# Patient Record
Sex: MALE | Race: WHITE | NOT HISPANIC OR LATINO | URBAN - METROPOLITAN AREA
[De-identification: names, ages, dates, MRNs, and addresses within clinical notes are randomized per-mention and may not be internally consistent; named-entity substitution may affect disease eponyms.]

---

## 2017-07-01 ENCOUNTER — EMERGENCY (EMERGENCY)
Facility: HOSPITAL | Age: 67
LOS: 0 days | Discharge: HOME | End: 2017-07-01

## 2017-07-01 DIAGNOSIS — Z79.02 LONG TERM (CURRENT) USE OF ANTITHROMBOTICS/ANTIPLATELETS: ICD-10-CM

## 2017-07-01 DIAGNOSIS — I10 ESSENTIAL (PRIMARY) HYPERTENSION: ICD-10-CM

## 2017-07-01 DIAGNOSIS — Z79.899 OTHER LONG TERM (CURRENT) DRUG THERAPY: ICD-10-CM

## 2017-07-01 DIAGNOSIS — I16.0 HYPERTENSIVE URGENCY: ICD-10-CM

## 2017-07-01 DIAGNOSIS — R10.32 LEFT LOWER QUADRANT PAIN: ICD-10-CM

## 2017-07-01 DIAGNOSIS — N20.0 CALCULUS OF KIDNEY: ICD-10-CM

## 2017-07-01 DIAGNOSIS — Z86.711 PERSONAL HISTORY OF PULMONARY EMBOLISM: ICD-10-CM

## 2017-07-01 DIAGNOSIS — E78.00 PURE HYPERCHOLESTEROLEMIA, UNSPECIFIED: ICD-10-CM

## 2017-07-01 DIAGNOSIS — I26.99 OTHER PULMONARY EMBOLISM WITHOUT ACUTE COR PULMONALE: ICD-10-CM

## 2017-07-01 DIAGNOSIS — N20.2 CALCULUS OF KIDNEY WITH CALCULUS OF URETER: ICD-10-CM

## 2017-07-01 DIAGNOSIS — I62.00 NONTRAUMATIC SUBDURAL HEMORRHAGE, UNSPECIFIED: ICD-10-CM

## 2017-07-05 ENCOUNTER — EMERGENCY (EMERGENCY)
Facility: HOSPITAL | Age: 67
LOS: 0 days | Discharge: HOME | End: 2017-07-05

## 2017-07-05 DIAGNOSIS — I10 ESSENTIAL (PRIMARY) HYPERTENSION: ICD-10-CM

## 2017-07-05 DIAGNOSIS — I16.0 HYPERTENSIVE URGENCY: ICD-10-CM

## 2017-07-05 DIAGNOSIS — Z79.02 LONG TERM (CURRENT) USE OF ANTITHROMBOTICS/ANTIPLATELETS: ICD-10-CM

## 2017-07-05 DIAGNOSIS — Z79.899 OTHER LONG TERM (CURRENT) DRUG THERAPY: ICD-10-CM

## 2017-07-05 DIAGNOSIS — I26.99 OTHER PULMONARY EMBOLISM WITHOUT ACUTE COR PULMONALE: ICD-10-CM

## 2017-07-05 DIAGNOSIS — R10.32 LEFT LOWER QUADRANT PAIN: ICD-10-CM

## 2017-07-05 DIAGNOSIS — I62.00 NONTRAUMATIC SUBDURAL HEMORRHAGE, UNSPECIFIED: ICD-10-CM

## 2017-07-05 DIAGNOSIS — E78.00 PURE HYPERCHOLESTEROLEMIA, UNSPECIFIED: ICD-10-CM

## 2017-07-05 DIAGNOSIS — Z87.442 PERSONAL HISTORY OF URINARY CALCULI: ICD-10-CM

## 2017-07-05 DIAGNOSIS — N23 UNSPECIFIED RENAL COLIC: ICD-10-CM

## 2017-07-05 DIAGNOSIS — N20.0 CALCULUS OF KIDNEY: ICD-10-CM

## 2017-10-05 ENCOUNTER — EMERGENCY (EMERGENCY)
Facility: HOSPITAL | Age: 67
LOS: 0 days | Discharge: LEFT BEFORE DISCHARGE | End: 2017-10-05
Admitting: INTERNAL MEDICINE

## 2017-10-05 DIAGNOSIS — Z79.899 OTHER LONG TERM (CURRENT) DRUG THERAPY: ICD-10-CM

## 2017-10-05 DIAGNOSIS — I16.0 HYPERTENSIVE URGENCY: ICD-10-CM

## 2017-10-05 DIAGNOSIS — Z86.711 PERSONAL HISTORY OF PULMONARY EMBOLISM: ICD-10-CM

## 2017-10-05 DIAGNOSIS — I26.99 OTHER PULMONARY EMBOLISM WITHOUT ACUTE COR PULMONALE: ICD-10-CM

## 2017-10-05 DIAGNOSIS — E78.00 PURE HYPERCHOLESTEROLEMIA, UNSPECIFIED: ICD-10-CM

## 2017-10-05 DIAGNOSIS — R94.31 ABNORMAL ELECTROCARDIOGRAM [ECG] [EKG]: ICD-10-CM

## 2017-10-05 DIAGNOSIS — I10 ESSENTIAL (PRIMARY) HYPERTENSION: ICD-10-CM

## 2017-10-05 DIAGNOSIS — Z79.02 LONG TERM (CURRENT) USE OF ANTITHROMBOTICS/ANTIPLATELETS: ICD-10-CM

## 2017-10-05 DIAGNOSIS — R07.89 OTHER CHEST PAIN: ICD-10-CM

## 2017-10-05 DIAGNOSIS — N20.0 CALCULUS OF KIDNEY: ICD-10-CM

## 2017-10-05 DIAGNOSIS — R20.0 ANESTHESIA OF SKIN: ICD-10-CM

## 2017-10-05 DIAGNOSIS — I62.00 NONTRAUMATIC SUBDURAL HEMORRHAGE, UNSPECIFIED: ICD-10-CM

## 2018-02-11 ENCOUNTER — EMERGENCY (EMERGENCY)
Facility: HOSPITAL | Age: 68
LOS: 0 days | Discharge: HOME | End: 2018-02-11
Attending: EMERGENCY MEDICINE | Admitting: INTERNAL MEDICINE

## 2018-02-11 VITALS
SYSTOLIC BLOOD PRESSURE: 157 MMHG | HEART RATE: 58 BPM | OXYGEN SATURATION: 99 % | DIASTOLIC BLOOD PRESSURE: 80 MMHG | HEIGHT: 71 IN | TEMPERATURE: 96 F | WEIGHT: 179.9 LBS | RESPIRATION RATE: 19 BRPM

## 2018-02-11 DIAGNOSIS — Z79.02 LONG TERM (CURRENT) USE OF ANTITHROMBOTICS/ANTIPLATELETS: ICD-10-CM

## 2018-02-11 DIAGNOSIS — Z87.891 PERSONAL HISTORY OF NICOTINE DEPENDENCE: ICD-10-CM

## 2018-02-11 DIAGNOSIS — I25.10 ATHEROSCLEROTIC HEART DISEASE OF NATIVE CORONARY ARTERY WITHOUT ANGINA PECTORIS: ICD-10-CM

## 2018-02-11 DIAGNOSIS — R09.81 NASAL CONGESTION: ICD-10-CM

## 2018-02-11 DIAGNOSIS — J32.9 CHRONIC SINUSITIS, UNSPECIFIED: ICD-10-CM

## 2018-02-11 DIAGNOSIS — Z79.899 OTHER LONG TERM (CURRENT) DRUG THERAPY: ICD-10-CM

## 2018-02-11 DIAGNOSIS — I10 ESSENTIAL (PRIMARY) HYPERTENSION: ICD-10-CM

## 2018-02-11 DIAGNOSIS — J02.9 ACUTE PHARYNGITIS, UNSPECIFIED: ICD-10-CM

## 2018-02-11 RX ORDER — MOMETASONE FUROATE 50 UG/1
2 SPRAY NASAL
Qty: 1 | Refills: 0
Start: 2018-02-11 | End: 2018-03-12

## 2018-02-11 RX ADMIN — Medication 1 TABLET(S): at 21:07

## 2018-02-11 NOTE — ED PROVIDER NOTE - PMH
Abdominal aortic aneurysm (AAA) without rupture    Kidney calculi    Non-ST elevation (NSTEMI) myocardial infarction

## 2018-02-11 NOTE — ED PROVIDER NOTE - OBJECTIVE STATEMENT
69 yo M with PMHx of HTN, AAA, and CAD presents to the ED c/o nasal congestion and sore throat that started yesterday. Symptoms have been constants since onset. Pt has been using nose strips for congestion without relief of symptoms. Pt denies fever, chills, nausea, vomiting, abdominal pain, back pain, dizziness, SOB, chest pain, trauma, sick contacts.

## 2018-02-11 NOTE — ED PROVIDER NOTE - PLAN OF CARE
I personally evaluated the patient. I reviewed the Resident’s or Physician Assistant’s note (as assigned above), and agree with the findings and plan except as documented in my note.  Presents with nasal congestion and difficulty breathing through nose. Denies chest pain or SOB. Exam shows +swollen turbinates with yellowish nasal discharge, throat red with small exudates, lungs clear, RR S1S2, abdomen soft NT +BS, no CCE. Will D/C on Augmentin and Nasonex and refer to PCP.

## 2018-02-11 NOTE — ED PROVIDER NOTE - CARE PLAN
Assessment and plan of treatment:	I personally evaluated the patient. I reviewed the Resident’s or Physician Assistant’s note (as assigned above), and agree with the findings and plan except as documented in my note.  Presents with nasal congestion and difficulty breathing through nose. Denies chest pain or SOB. Exam shows +swollen turbinates with yellowish nasal discharge, throat red with small exudates, lungs clear, RR S1S2, abdomen soft NT +BS, no CCE. Will D/C on Augmentin and Nasonex and refer to PCP. Principal Discharge DX:	Sinusitis  Assessment and plan of treatment:	I personally evaluated the patient. I reviewed the Resident’s or Physician Assistant’s note (as assigned above), and agree with the findings and plan except as documented in my note.  Presents with nasal congestion and difficulty breathing through nose. Denies chest pain or SOB. Exam shows +swollen turbinates with yellowish nasal discharge, throat red with small exudates, lungs clear, RR S1S2, abdomen soft NT +BS, no CCE. Will D/C on Augmentin and Nasonex and refer to PCP.  Secondary Diagnosis:	Pharyngitis, unspecified etiology Principal Discharge DX:	Sinusitis  Secondary Diagnosis:	Pharyngitis, unspecified etiology

## 2019-12-13 PROBLEM — I21.4 NON-ST ELEVATION (NSTEMI) MYOCARDIAL INFARCTION: Chronic | Status: ACTIVE | Noted: 2018-02-11

## 2019-12-13 PROBLEM — I71.4 ABDOMINAL AORTIC ANEURYSM, WITHOUT RUPTURE: Chronic | Status: ACTIVE | Noted: 2018-02-11

## 2019-12-13 PROBLEM — N20.0 CALCULUS OF KIDNEY: Chronic | Status: ACTIVE | Noted: 2018-02-11

## 2019-12-18 ENCOUNTER — OUTPATIENT (OUTPATIENT)
Dept: OUTPATIENT SERVICES | Facility: HOSPITAL | Age: 69
LOS: 1 days | Discharge: HOME | End: 2019-12-18
Payer: MEDICARE

## 2019-12-18 DIAGNOSIS — R06.00 DYSPNEA, UNSPECIFIED: ICD-10-CM

## 2019-12-18 PROCEDURE — 78452 HT MUSCLE IMAGE SPECT MULT: CPT | Mod: 26

## 2022-06-02 ENCOUNTER — EMERGENCY (EMERGENCY)
Facility: HOSPITAL | Age: 72
LOS: 0 days | Discharge: HOME | End: 2022-06-02
Attending: EMERGENCY MEDICINE | Admitting: EMERGENCY MEDICINE
Payer: MEDICARE

## 2022-06-02 VITALS
OXYGEN SATURATION: 99 % | HEIGHT: 71 IN | TEMPERATURE: 98 F | RESPIRATION RATE: 18 BRPM | HEART RATE: 59 BPM | DIASTOLIC BLOOD PRESSURE: 89 MMHG | WEIGHT: 175.05 LBS | SYSTOLIC BLOOD PRESSURE: 195 MMHG

## 2022-06-02 VITALS
DIASTOLIC BLOOD PRESSURE: 78 MMHG | SYSTOLIC BLOOD PRESSURE: 155 MMHG | HEART RATE: 52 BPM | TEMPERATURE: 98 F | RESPIRATION RATE: 18 BRPM | OXYGEN SATURATION: 98 %

## 2022-06-02 DIAGNOSIS — Z79.02 LONG TERM (CURRENT) USE OF ANTITHROMBOTICS/ANTIPLATELETS: ICD-10-CM

## 2022-06-02 DIAGNOSIS — I10 ESSENTIAL (PRIMARY) HYPERTENSION: ICD-10-CM

## 2022-06-02 DIAGNOSIS — R07.89 OTHER CHEST PAIN: ICD-10-CM

## 2022-06-02 DIAGNOSIS — R00.1 BRADYCARDIA, UNSPECIFIED: ICD-10-CM

## 2022-06-02 DIAGNOSIS — Z20.822 CONTACT WITH AND (SUSPECTED) EXPOSURE TO COVID-19: ICD-10-CM

## 2022-06-02 DIAGNOSIS — Z87.891 PERSONAL HISTORY OF NICOTINE DEPENDENCE: ICD-10-CM

## 2022-06-02 DIAGNOSIS — R06.02 SHORTNESS OF BREATH: ICD-10-CM

## 2022-06-02 DIAGNOSIS — Z86.2 PERSONAL HISTORY OF DISEASES OF THE BLOOD AND BLOOD-FORMING ORGANS AND CERTAIN DISORDERS INVOLVING THE IMMUNE MECHANISM: ICD-10-CM

## 2022-06-02 DIAGNOSIS — E78.5 HYPERLIPIDEMIA, UNSPECIFIED: ICD-10-CM

## 2022-06-02 DIAGNOSIS — I25.2 OLD MYOCARDIAL INFARCTION: ICD-10-CM

## 2022-06-02 DIAGNOSIS — Z86.711 PERSONAL HISTORY OF PULMONARY EMBOLISM: ICD-10-CM

## 2022-06-02 LAB
ALBUMIN SERPL ELPH-MCNC: 4.5 G/DL — SIGNIFICANT CHANGE UP (ref 3.5–5.2)
ALP SERPL-CCNC: 93 U/L — SIGNIFICANT CHANGE UP (ref 30–115)
ALT FLD-CCNC: 17 U/L — SIGNIFICANT CHANGE UP (ref 0–41)
ANION GAP SERPL CALC-SCNC: 12 MMOL/L — SIGNIFICANT CHANGE UP (ref 7–14)
AST SERPL-CCNC: 21 U/L — SIGNIFICANT CHANGE UP (ref 0–41)
BASOPHILS # BLD AUTO: 0 K/UL — SIGNIFICANT CHANGE UP (ref 0–0.2)
BASOPHILS NFR BLD AUTO: 0 % — SIGNIFICANT CHANGE UP (ref 0–1)
BILIRUB SERPL-MCNC: 0.7 MG/DL — SIGNIFICANT CHANGE UP (ref 0.2–1.2)
BUN SERPL-MCNC: 16 MG/DL — SIGNIFICANT CHANGE UP (ref 10–20)
CALCIUM SERPL-MCNC: 9.3 MG/DL — SIGNIFICANT CHANGE UP (ref 8.5–10.1)
CHLORIDE SERPL-SCNC: 105 MMOL/L — SIGNIFICANT CHANGE UP (ref 98–110)
CO2 SERPL-SCNC: 24 MMOL/L — SIGNIFICANT CHANGE UP (ref 17–32)
CREAT SERPL-MCNC: 0.8 MG/DL — SIGNIFICANT CHANGE UP (ref 0.7–1.5)
D DIMER BLD IA.RAPID-MCNC: 177 NG/ML DDU — SIGNIFICANT CHANGE UP (ref 0–230)
EGFR: 93 ML/MIN/1.73M2 — SIGNIFICANT CHANGE UP
EOSINOPHIL # BLD AUTO: 0.44 K/UL — SIGNIFICANT CHANGE UP (ref 0–0.7)
EOSINOPHIL NFR BLD AUTO: 7.1 % — SIGNIFICANT CHANGE UP (ref 0–8)
FLUAV AG NPH QL: SIGNIFICANT CHANGE UP
FLUBV AG NPH QL: SIGNIFICANT CHANGE UP
GIANT PLATELETS BLD QL SMEAR: PRESENT — SIGNIFICANT CHANGE UP
GLUCOSE SERPL-MCNC: 100 MG/DL — HIGH (ref 70–99)
HCT VFR BLD CALC: 40 % — LOW (ref 42–52)
HGB BLD-MCNC: 13.7 G/DL — LOW (ref 14–18)
LYMPHOCYTES # BLD AUTO: 0.66 K/UL — LOW (ref 1.2–3.4)
LYMPHOCYTES # BLD AUTO: 10.6 % — LOW (ref 20.5–51.1)
MANUAL SMEAR VERIFICATION: SIGNIFICANT CHANGE UP
MCHC RBC-ENTMCNC: 33.3 PG — HIGH (ref 27–31)
MCHC RBC-ENTMCNC: 34.3 G/DL — SIGNIFICANT CHANGE UP (ref 32–37)
MCV RBC AUTO: 97.3 FL — HIGH (ref 80–94)
MONOCYTES # BLD AUTO: 0.93 K/UL — HIGH (ref 0.1–0.6)
MONOCYTES NFR BLD AUTO: 15 % — HIGH (ref 1.7–9.3)
NEUTROPHILS # BLD AUTO: 4 K/UL — SIGNIFICANT CHANGE UP (ref 1.4–6.5)
NEUTROPHILS NFR BLD AUTO: 64.6 % — SIGNIFICANT CHANGE UP (ref 42.2–75.2)
NRBC # BLD: 1 /100 — HIGH (ref 0–0)
NRBC # BLD: SIGNIFICANT CHANGE UP /100 WBCS (ref 0–0)
NT-PROBNP SERPL-SCNC: 1298 PG/ML — HIGH (ref 0–300)
PLAT MORPH BLD: NORMAL — SIGNIFICANT CHANGE UP
PLATELET # BLD AUTO: 106 K/UL — LOW (ref 130–400)
PLATELET COUNT - ESTIMATE: ABNORMAL
POTASSIUM SERPL-MCNC: 4.6 MMOL/L — SIGNIFICANT CHANGE UP (ref 3.5–5)
POTASSIUM SERPL-SCNC: 4.6 MMOL/L — SIGNIFICANT CHANGE UP (ref 3.5–5)
PROT SERPL-MCNC: 7.2 G/DL — SIGNIFICANT CHANGE UP (ref 6–8)
RBC # BLD: 4.11 M/UL — LOW (ref 4.7–6.1)
RBC # FLD: 13.1 % — SIGNIFICANT CHANGE UP (ref 11.5–14.5)
RBC BLD AUTO: NORMAL — SIGNIFICANT CHANGE UP
RSV RNA NPH QL NAA+NON-PROBE: SIGNIFICANT CHANGE UP
SARS-COV-2 RNA SPEC QL NAA+PROBE: SIGNIFICANT CHANGE UP
SODIUM SERPL-SCNC: 141 MMOL/L — SIGNIFICANT CHANGE UP (ref 135–146)
TOXIC GRANULES BLD QL SMEAR: PRESENT — SIGNIFICANT CHANGE UP
TROPONIN T SERPL-MCNC: <0.01 NG/ML — SIGNIFICANT CHANGE UP
TROPONIN T SERPL-MCNC: <0.01 NG/ML — SIGNIFICANT CHANGE UP
VARIANT LYMPHS # BLD: 2.7 % — SIGNIFICANT CHANGE UP (ref 0–5)
WBC # BLD: 6.19 K/UL — SIGNIFICANT CHANGE UP (ref 4.8–10.8)
WBC # FLD AUTO: 6.19 K/UL — SIGNIFICANT CHANGE UP (ref 4.8–10.8)

## 2022-06-02 PROCEDURE — 99285 EMERGENCY DEPT VISIT HI MDM: CPT

## 2022-06-02 PROCEDURE — 71045 X-RAY EXAM CHEST 1 VIEW: CPT | Mod: 26

## 2022-06-02 PROCEDURE — 93010 ELECTROCARDIOGRAM REPORT: CPT | Mod: 76

## 2022-06-02 NOTE — ED ADULT NURSE NOTE - OBJECTIVE STATEMENT
Pt presents to ED complaining of sob for x1 hour pta, pt states this happens frequently, usually self resolving within 20 mins but pt grew concerned when it did not subside on own. Pt breathing easy and unlabored, pt denies CP, denies palpitations, denies fever.

## 2022-06-02 NOTE — ED PROVIDER NOTE - ATTENDING CONTRIBUTION TO CARE
72-year-old male history of hypertension anemia NSTEMI presenting for episode of chest discomfort with associated shortness of breath.  No other acute complaints.  No lower extremity pain or swelling.  Follows with Dr. Ortiz.  States that he does not tolerate stress tests well.  Well appearing, NAD, non toxic. NCAT PERRLA EOMI neck supple non tender normal wob cta bl rrr abdomen s nt nd no rebound no guarding WWPx4 neuro non focal

## 2022-06-02 NOTE — ED ADULT NURSE NOTE - NSICDXPASTMEDICALHX_GEN_ALL_CORE_FT
PAST MEDICAL HISTORY:  Abdominal aortic aneurysm (AAA) without rupture     Kidney calculi     Non-ST elevation (NSTEMI) myocardial infarction

## 2022-06-02 NOTE — ED ADULT NURSE NOTE - NSIMPLEMENTINTERV_GEN_ALL_ED
Implemented All Universal Safety Interventions:  [place] to call system. Call bell, personal items and telephone within reach. Instruct patient to call for assistance. Room bathroom lighting operational. Non-slip footwear when patient is off stretcher. Physically safe environment: no spills, clutter or unnecessary equipment. Stretcher in lowest position, wheels locked, appropriate side rails in place.

## 2022-06-02 NOTE — ED PROVIDER NOTE - CARE PROVIDER_API CALL
Jon Ortiz (MD)  Cardiovascular Disease; Internal Medicine; Interventional Cardiology  [address] [place] [address]  Phone: [phone]  Fax: [phone]  Follow Up Time: 1-3 Days

## 2022-06-02 NOTE — ED PROVIDER NOTE - PHYSICAL EXAMINATION
Vital Signs: I have reviewed the initial vital signs.  Constitutional: well-nourished, appears stated age, no acute distress  Cardiovascular: regular rate, regular rhythm, well-perfused extremities  Respiratory: unlabored respiratory effort, clear to auscultation bilaterally  Gastrointestinal: soft, non-tender abdomen  Musculoskeletal: supple neck, no lower extremity edema  Integumentary: warm, dry, no rash  Neurologic: awake, alert, extremities’ motor and sensory functions grossly intact  Psychiatric: appropriate mood, appropriate affect Vital Signs: I have reviewed the initial vital signs.  Constitutional: well-nourished, appears stated age, no acute distress  Cardiovascular: regular rate, regular rhythm, well-perfused extremities, (+) holosystolic murmur  Respiratory: unlabored respiratory effort, clear to auscultation bilaterally  Gastrointestinal: soft, non-tender abdomen  Musculoskeletal: supple neck, no lower extremity edema  Integumentary: warm, dry, no rash  Neurologic: awake, alert, extremities’ motor and sensory functions grossly intact  Psychiatric: appropriate mood, appropriate affect

## 2022-06-02 NOTE — ED PROVIDER NOTE - CLINICAL SUMMARY MEDICAL DECISION MAKING FREE TEXT BOX
72-year-old male history of hypertension anemia NSTEMI presenting for episode of chest discomfort with associated shortness of breath.  No other acute complaints.  No lower extremity pain or swelling.  Follows with Dr. Ortiz.  States that he does not tolerate stress tests well.  Well appearing, NAD, non toxic. NCAT PERRLA EOMI neck supple non tender normal wob cta bl rrr abdomen s nt nd no rebound no guarding WWPx4 neuro non focal. labs ekg imaging reviewed. multiple attempts to reach Dr. Ortiz, pt insists on discharge and outpatient follow-up. Aware of all results, given a copy of all available results, comfortable with discharge and follow-up outpatient, strict return precautions given. Endorses understanding of all of this and aware that they can return at any time for new or concerning symptoms. No further questions or concerns at this time

## 2022-06-02 NOTE — ED ADULT TRIAGE NOTE - CHIEF COMPLAINT QUOTE
pt complains of sob x1 hour pta, pt states this happens frequently, usually self resolving within 20 mins

## 2022-06-02 NOTE — ED PROVIDER NOTE - OBJECTIVE STATEMENT
The pt is a 72y M w/ hx of HTN, HLD, NSTEMI 2018, PE presenting with episode of SOB. States that he gets these episodes, usually self resolve. Today's episode seem to last longer than normal so he came to the ED, however is now resolved. Denies fever, cough, chest pain, SOB, N/V, abdominal pain, diarrhea, dysuria. The pt is a 72y M w/ hx of HTN, HLD, NSTEMI 2018, PE presenting with episode of SOB. States that he gets these episodes, usually self resolve. Today's episode seem to last longer than normal so he came to the ED, however is now resolved. Denies fever, cough, chest pain, N/V, abdominal pain, diarrhea, dysuria.

## 2022-06-02 NOTE — ED PROVIDER NOTE - PATIENT PORTAL LINK FT
You can access the FollowMyHealth Patient Portal offered by [facility] by registering at the following website: [email or web address]. By joining [email or web address]’s FollowMyHealth portal, you will also be able to view your health information using other applications (apps) compatible with our system.

## 2022-07-14 ENCOUNTER — OUTPATIENT (OUTPATIENT)
Dept: OUTPATIENT SERVICES | Facility: HOSPITAL | Age: 72
LOS: 1 days | Discharge: HOME | End: 2022-07-14

## 2022-07-21 ENCOUNTER — OUTPATIENT (OUTPATIENT)
Dept: OUTPATIENT SERVICES | Facility: HOSPITAL | Age: 72
LOS: 1 days | Discharge: HOME | End: 2022-07-21

## 2022-07-21 LAB
ANION GAP SERPL CALC-SCNC: 10 MMOL/L — SIGNIFICANT CHANGE UP (ref 7–14)
BUN SERPL-MCNC: 15 MG/DL — SIGNIFICANT CHANGE UP (ref 10–20)
CALCIUM SERPL-MCNC: 8.9 MG/DL — SIGNIFICANT CHANGE UP (ref 8.5–10.1)
CHLORIDE SERPL-SCNC: 103 MMOL/L — SIGNIFICANT CHANGE UP (ref 98–110)
CO2 SERPL-SCNC: 23 MMOL/L — SIGNIFICANT CHANGE UP (ref 17–32)
CREAT SERPL-MCNC: 0.8 MG/DL — SIGNIFICANT CHANGE UP (ref 0.7–1.5)
EGFR: 93 ML/MIN/1.73M2 — SIGNIFICANT CHANGE UP
GLUCOSE SERPL-MCNC: 93 MG/DL — SIGNIFICANT CHANGE UP (ref 70–99)
HCT VFR BLD CALC: 37.6 % — LOW (ref 42–52)
HGB BLD-MCNC: 12.8 G/DL — LOW (ref 14–18)
MCHC RBC-ENTMCNC: 33 PG — HIGH (ref 27–31)
MCHC RBC-ENTMCNC: 34 G/DL — SIGNIFICANT CHANGE UP (ref 32–37)
MCV RBC AUTO: 96.9 FL — HIGH (ref 80–94)
NRBC # BLD: 0 /100 WBCS — SIGNIFICANT CHANGE UP (ref 0–0)
PLATELET # BLD AUTO: 125 K/UL — LOW (ref 130–400)
POTASSIUM SERPL-MCNC: 4.9 MMOL/L — SIGNIFICANT CHANGE UP (ref 3.5–5)
POTASSIUM SERPL-SCNC: 4.9 MMOL/L — SIGNIFICANT CHANGE UP (ref 3.5–5)
RBC # BLD: 3.88 M/UL — LOW (ref 4.7–6.1)
RBC # FLD: 13.7 % — SIGNIFICANT CHANGE UP (ref 11.5–14.5)
SODIUM SERPL-SCNC: 136 MMOL/L — SIGNIFICANT CHANGE UP (ref 135–146)
WBC # BLD: 7.33 K/UL — SIGNIFICANT CHANGE UP (ref 4.8–10.8)
WBC # FLD AUTO: 7.33 K/UL — SIGNIFICANT CHANGE UP (ref 4.8–10.8)

## 2022-07-21 RX ORDER — ASPIRIN/CALCIUM CARB/MAGNESIUM 324 MG
1 TABLET ORAL
Qty: 30 | Refills: 0
Start: 2022-07-21 | End: 2022-08-19

## 2022-07-21 RX ORDER — AMLODIPINE BESYLATE 2.5 MG/1
1 TABLET ORAL
Qty: 0 | Refills: 0 | DISCHARGE

## 2022-07-21 RX ORDER — SODIUM CHLORIDE 9 MG/ML
1000 INJECTION INTRAMUSCULAR; INTRAVENOUS; SUBCUTANEOUS
Refills: 0 | Status: DISCONTINUED | OUTPATIENT
Start: 2022-07-21 | End: 2022-08-04

## 2022-07-21 RX ORDER — CLOPIDOGREL BISULFATE 75 MG/1
1 TABLET, FILM COATED ORAL
Qty: 0 | Refills: 0 | DISCHARGE

## 2022-07-21 RX ORDER — METOPROLOL TARTRATE 50 MG
1 TABLET ORAL
Qty: 0 | Refills: 0 | DISCHARGE

## 2022-07-21 RX ORDER — AMLODIPINE BESYLATE 2.5 MG/1
1 TABLET ORAL
Qty: 30 | Refills: 0
Start: 2022-07-21 | End: 2022-08-19

## 2022-07-21 NOTE — H&P CARDIOLOGY - HISTORY OF PRESENT ILLNESS
73 y/o male presents here today for [facility] due to c/o dyspnea on exertion.     Pre cath note:    indication:  [ ] STEMI                [ ] NSTEMI                 [ ] Acute coronary syndrome                     [ ]Unstable Angina   [ ] high risk  [ ] intermediate risk  [ ] low risk                     [ ] Stable Angina     non-invasive testing:                          Date:                     result: [ ] high risk  [ ] intermediate risk  [ ] low risk    Anti- Anginal medications:                    [x ] not used                       [ ] used                   [ ] not used but strong indication not to use    Ejection Fraction                   [ ] <29            [ ] 30-39%   [ ] 40-49%     [ ]>50%    CHF                   [ ] active (within last 14 days on meds   [ ] Chronic (on meds but no exacerbation)    COPD                   [ ] mild (on chronic bronchodilators)  [ ] moderate (on chronic steroid therapy)      [ ] severe (indication for home O2 or PACO2 >50)    Other risk factors:                       [ ] Previous MI                     [ ] CVA/ stroke                    [ ] carotid stent/ CEA                    [ ] PVD/PAD- (arterial aneurysm, non-palpable pulses, tortuous vessel with inability to insert catheter, infra-renal dissection, renal or subclavian artery stenosis)                    [ ] diabetic                    [ ] previous CABG                    [ ] Renal Failure       Adjusted Cath Bleeding Risk: 1.2%

## 2022-07-21 NOTE — PROGRESS NOTE ADULT - SUBJECTIVE AND OBJECTIVE BOX
Patient aware of risk and benefits of planned procedure up to and including death and agrees with planned procedure.22 @ 21:40        POST OPERATIVE PROCEDURAL DOCUMENTATION  PRE-OP DIAGNOSIS:      PROCEDURE:   LEFT HEART CATHERIZATION    Physician:IRIS Ortiz MD  Assistant:  MD    ANESTHESIA TYPE:  [ X] Sedation  [ X] Local/Regional  [   ]General Anesthesia    ESTIMATED BLOOD LOSS:      less than 10 mL    CONDITION  [X ] Good  [   ] Fair  [   ] Serious  [   ] Critical      SPECIMENS REMOVED (IF APPLICABLE):   None          IMPLANTS (IF APPLICABLE)      FINDINGS:  LEFT HEART CATHERIZATION                                    LVEF%:  LVEDP:    Left main:    LAD:        mild            Dia-70    Left Circumflex: mild  OM:      Right Cornary Artery:  mild  RPDA:    RPL:        RIGHT HEART CATHERIZATION  PA:  PCW:  CO/CI:    PERCUTANEOUS CORONARY INTERVENTIONS:      COMPLICATIONS:  None      POST-OP DIAGNOSIS    cad      PLAN OF CARE    aggressive risk factor mod and medical therapy

## 2022-07-21 NOTE — ASU PATIENT PROFILE, ADULT - FALL HARM RISK - UNIVERSAL INTERVENTIONS
Bed in lowest position, wheels locked, appropriate side rails in place/Call bell, personal items and telephone in reach/Instruct patient to call for assistance before getting out of bed or chair/Non-slip footwear when patient is out of bed/[place] to call system/Physically safe environment - no spills, clutter or unnecessary equipment/Purposeful Proactive Rounding/Room/bathroom lighting operational, light cord in reach

## 2022-07-21 NOTE — H&P CARDIOLOGY - NSICDXPASTMEDICALHX_GEN_ALL_CORE_FT
PAST MEDICAL HISTORY:  2019 novel coronavirus disease (COVID-19) 5/2022    Abdominal aortic aneurysm (AAA) without rupture     Aortic stenosis     HLD (hyperlipidemia)     HTN (hypertension)     Kidney calculi     Non-ST elevation (NSTEMI) myocardial infarction

## 2022-07-21 NOTE — H&P CARDIOLOGY - NSICDXFAMILYHX_GEN_ALL_CORE_FT
FAMILY HISTORY:  Father  Still living? No  FH: CAD (coronary artery disease), Age at diagnosis: Age Unknown    Sibling  Still living? No  FH: CAD (coronary artery disease), Age at diagnosis: Age Unknown

## 2022-08-02 DIAGNOSIS — Z79.899 OTHER LONG TERM (CURRENT) DRUG THERAPY: ICD-10-CM

## 2022-08-02 DIAGNOSIS — I10 ESSENTIAL (PRIMARY) HYPERTENSION: ICD-10-CM

## 2022-08-02 DIAGNOSIS — Z79.02 LONG TERM (CURRENT) USE OF ANTITHROMBOTICS/ANTIPLATELETS: ICD-10-CM

## 2022-08-02 DIAGNOSIS — I25.118 ATHEROSCLEROTIC HEART DISEASE OF NATIVE CORONARY ARTERY WITH OTHER FORMS OF ANGINA PECTORIS: ICD-10-CM

## 2022-08-02 DIAGNOSIS — Z86.16 PERSONAL HISTORY OF COVID-19: ICD-10-CM

## 2022-08-02 DIAGNOSIS — Z87.442 PERSONAL HISTORY OF URINARY CALCULI: ICD-10-CM

## 2022-08-02 DIAGNOSIS — Z82.49 FAMILY HISTORY OF ISCHEMIC HEART DISEASE AND OTHER DISEASES OF THE CIRCULATORY SYSTEM: ICD-10-CM

## 2022-08-02 DIAGNOSIS — I71.4 ABDOMINAL AORTIC ANEURYSM, WITHOUT RUPTURE: ICD-10-CM

## 2022-08-02 DIAGNOSIS — E78.5 HYPERLIPIDEMIA, UNSPECIFIED: ICD-10-CM

## 2022-08-02 DIAGNOSIS — I20.9 ANGINA PECTORIS, UNSPECIFIED: ICD-10-CM

## 2022-09-01 PROBLEM — I35.0 NONRHEUMATIC AORTIC (VALVE) STENOSIS: Chronic | Status: ACTIVE | Noted: 2022-07-21

## 2022-09-01 PROBLEM — Z00.00 ENCOUNTER FOR PREVENTIVE HEALTH EXAMINATION: Status: ACTIVE | Noted: 2022-09-01

## 2022-09-01 PROBLEM — U07.1 COVID-19: Chronic | Status: ACTIVE | Noted: 2022-07-21

## 2022-09-01 PROBLEM — I10 ESSENTIAL (PRIMARY) HYPERTENSION: Chronic | Status: ACTIVE | Noted: 2022-07-21

## 2022-09-01 PROBLEM — E78.5 HYPERLIPIDEMIA, UNSPECIFIED: Chronic | Status: ACTIVE | Noted: 2022-07-21

## 2022-10-04 ENCOUNTER — APPOINTMENT (OUTPATIENT)
Dept: CARDIOLOGY | Facility: CLINIC | Age: 72
End: 2022-10-04

## 2023-01-26 ENCOUNTER — INPATIENT (INPATIENT)
Facility: HOSPITAL | Age: 73
LOS: 3 days | Discharge: HOME | End: 2023-01-30
Attending: PSYCHIATRY & NEUROLOGY | Admitting: PSYCHIATRY & NEUROLOGY
Payer: MEDICARE

## 2023-01-26 VITALS
TEMPERATURE: 97 F | DIASTOLIC BLOOD PRESSURE: 71 MMHG | RESPIRATION RATE: 18 BRPM | SYSTOLIC BLOOD PRESSURE: 158 MMHG | OXYGEN SATURATION: 100 % | HEART RATE: 60 BPM

## 2023-01-26 LAB
ALBUMIN SERPL ELPH-MCNC: 4.4 G/DL — SIGNIFICANT CHANGE UP (ref 3.5–5.2)
ALP SERPL-CCNC: 111 U/L — SIGNIFICANT CHANGE UP (ref 30–115)
ALT FLD-CCNC: 30 U/L — SIGNIFICANT CHANGE UP (ref 0–41)
ANION GAP SERPL CALC-SCNC: 10 MMOL/L — SIGNIFICANT CHANGE UP (ref 7–14)
APTT BLD: 32.8 SEC — SIGNIFICANT CHANGE UP (ref 27–39.2)
AST SERPL-CCNC: 30 U/L — SIGNIFICANT CHANGE UP (ref 0–41)
BASOPHILS # BLD AUTO: 0.02 K/UL — SIGNIFICANT CHANGE UP (ref 0–0.2)
BASOPHILS NFR BLD AUTO: 0.3 % — SIGNIFICANT CHANGE UP (ref 0–1)
BILIRUB SERPL-MCNC: 0.6 MG/DL — SIGNIFICANT CHANGE UP (ref 0.2–1.2)
BUN SERPL-MCNC: 17 MG/DL — SIGNIFICANT CHANGE UP (ref 10–20)
CALCIUM SERPL-MCNC: 9.5 MG/DL — SIGNIFICANT CHANGE UP (ref 8.4–10.5)
CHLORIDE SERPL-SCNC: 104 MMOL/L — SIGNIFICANT CHANGE UP (ref 98–110)
CO2 SERPL-SCNC: 26 MMOL/L — SIGNIFICANT CHANGE UP (ref 17–32)
CREAT SERPL-MCNC: 0.8 MG/DL — SIGNIFICANT CHANGE UP (ref 0.7–1.5)
EGFR: 93 ML/MIN/1.73M2 — SIGNIFICANT CHANGE UP
EOSINOPHIL # BLD AUTO: 0.4 K/UL — SIGNIFICANT CHANGE UP (ref 0–0.7)
EOSINOPHIL NFR BLD AUTO: 6.8 % — SIGNIFICANT CHANGE UP (ref 0–8)
GLUCOSE SERPL-MCNC: 104 MG/DL — HIGH (ref 70–99)
HCT VFR BLD CALC: 38.6 % — LOW (ref 42–52)
HGB BLD-MCNC: 12.8 G/DL — LOW (ref 14–18)
IMM GRANULOCYTES NFR BLD AUTO: 0.3 % — SIGNIFICANT CHANGE UP (ref 0.1–0.3)
INR BLD: 1.14 RATIO — SIGNIFICANT CHANGE UP (ref 0.65–1.3)
LYMPHOCYTES # BLD AUTO: 1.14 K/UL — LOW (ref 1.2–3.4)
LYMPHOCYTES # BLD AUTO: 19.3 % — LOW (ref 20.5–51.1)
MCHC RBC-ENTMCNC: 32.9 PG — HIGH (ref 27–31)
MCHC RBC-ENTMCNC: 33.2 G/DL — SIGNIFICANT CHANGE UP (ref 32–37)
MCV RBC AUTO: 99.2 FL — HIGH (ref 80–94)
MONOCYTES # BLD AUTO: 0.55 K/UL — SIGNIFICANT CHANGE UP (ref 0.1–0.6)
MONOCYTES NFR BLD AUTO: 9.3 % — SIGNIFICANT CHANGE UP (ref 1.7–9.3)
NEUTROPHILS # BLD AUTO: 3.78 K/UL — SIGNIFICANT CHANGE UP (ref 1.4–6.5)
NEUTROPHILS NFR BLD AUTO: 64 % — SIGNIFICANT CHANGE UP (ref 42.2–75.2)
NRBC # BLD: 0 /100 WBCS — SIGNIFICANT CHANGE UP (ref 0–0)
PLATELET # BLD AUTO: 127 K/UL — LOW (ref 130–400)
POTASSIUM SERPL-MCNC: 4.3 MMOL/L — SIGNIFICANT CHANGE UP (ref 3.5–5)
POTASSIUM SERPL-SCNC: 4.3 MMOL/L — SIGNIFICANT CHANGE UP (ref 3.5–5)
PROT SERPL-MCNC: 7 G/DL — SIGNIFICANT CHANGE UP (ref 6–8)
PROTHROM AB SERPL-ACNC: 13.1 SEC — HIGH (ref 9.95–12.87)
RBC # BLD: 3.89 M/UL — LOW (ref 4.7–6.1)
RBC # FLD: 13.2 % — SIGNIFICANT CHANGE UP (ref 11.5–14.5)
SARS-COV-2 RNA SPEC QL NAA+PROBE: SIGNIFICANT CHANGE UP
SODIUM SERPL-SCNC: 140 MMOL/L — SIGNIFICANT CHANGE UP (ref 135–146)
TROPONIN T SERPL-MCNC: <0.01 NG/ML — SIGNIFICANT CHANGE UP
WBC # BLD: 5.91 K/UL — SIGNIFICANT CHANGE UP (ref 4.8–10.8)
WBC # FLD AUTO: 5.91 K/UL — SIGNIFICANT CHANGE UP (ref 4.8–10.8)

## 2023-01-26 PROCEDURE — 93010 ELECTROCARDIOGRAM REPORT: CPT

## 2023-01-26 PROCEDURE — 70551 MRI BRAIN STEM W/O DYE: CPT | Mod: 26

## 2023-01-26 PROCEDURE — 70498 CT ANGIOGRAPHY NECK: CPT | Mod: 26,MA

## 2023-01-26 PROCEDURE — 70450 CT HEAD/BRAIN W/O DYE: CPT | Mod: 26,77,59

## 2023-01-26 PROCEDURE — 70450 CT HEAD/BRAIN W/O DYE: CPT | Mod: 26,MA,59

## 2023-01-26 PROCEDURE — 99291 CRITICAL CARE FIRST HOUR: CPT

## 2023-01-26 PROCEDURE — 0042T: CPT

## 2023-01-26 PROCEDURE — 70496 CT ANGIOGRAPHY HEAD: CPT | Mod: 26,MA

## 2023-01-26 RX ORDER — TENECTEPLASE 50 MG
20 KIT INTRAVENOUS ONCE
Refills: 0 | Status: COMPLETED | OUTPATIENT
Start: 2023-01-26 | End: 2023-01-26

## 2023-01-26 RX ORDER — SODIUM CHLORIDE 9 MG/ML
10 INJECTION INTRAMUSCULAR; INTRAVENOUS; SUBCUTANEOUS ONCE
Refills: 0 | Status: COMPLETED | OUTPATIENT
Start: 2023-01-26 | End: 2023-01-26

## 2023-01-26 RX ORDER — AMLODIPINE BESYLATE 2.5 MG/1
1 TABLET ORAL
Qty: 0 | Refills: 0 | DISCHARGE

## 2023-01-26 RX ORDER — ATORVASTATIN CALCIUM 80 MG/1
80 TABLET, FILM COATED ORAL AT BEDTIME
Refills: 0 | Status: DISCONTINUED | OUTPATIENT
Start: 2023-01-26 | End: 2023-01-30

## 2023-01-26 RX ORDER — METOPROLOL TARTRATE 50 MG
1 TABLET ORAL
Qty: 0 | Refills: 0 | DISCHARGE

## 2023-01-26 RX ORDER — ONDANSETRON 8 MG/1
4 TABLET, FILM COATED ORAL ONCE
Refills: 0 | Status: COMPLETED | OUTPATIENT
Start: 2023-01-26 | End: 2023-01-26

## 2023-01-26 RX ORDER — RANOLAZINE 500 MG/1
1 TABLET, FILM COATED, EXTENDED RELEASE ORAL
Qty: 0 | Refills: 0 | DISCHARGE

## 2023-01-26 RX ORDER — ALBUTEROL 90 UG/1
2 AEROSOL, METERED ORAL EVERY 6 HOURS
Refills: 0 | Status: DISCONTINUED | OUTPATIENT
Start: 2023-01-26 | End: 2023-01-30

## 2023-01-26 RX ADMIN — SODIUM CHLORIDE 10 MILLILITER(S): 9 INJECTION INTRAMUSCULAR; INTRAVENOUS; SUBCUTANEOUS at 13:23

## 2023-01-26 RX ADMIN — ONDANSETRON 4 MILLIGRAM(S): 8 TABLET, FILM COATED ORAL at 12:21

## 2023-01-26 RX ADMIN — TENECTEPLASE 2880 MILLIGRAM(S): KIT at 13:23

## 2023-01-26 RX ADMIN — ATORVASTATIN CALCIUM 80 MILLIGRAM(S): 80 TABLET, FILM COATED ORAL at 22:25

## 2023-01-26 NOTE — CONSULT NOTE ADULT - ASSESSMENT
IMPRESSION: Rehab of r/o CVA / loss of vision / gait ataxia / HTN, HLD, AAA, aortic stenosis     PRECAUTIONS: [   ] Cardiac  [   ] Respiratory  [   ] Seizures [   ] Contact Isolation  [   ] Droplet Isolation  [   ] Other    Weight Bearing Status:     RECOMMENDATION: f/u MRI of brain                                   f/u neurology     Out of Bed to Chair     DVT/Decubiti Prophylaxis    REHAB PLAN:     [   x ] Bedside P/T 3-5 times a week   [  x  ]   Bedside O/T  2-3 times a week             [  x  ] Speech Therapy               [    ]  No Rehab Therapy Indicated   Conditioning/ROM                                    ADL  Bed Mobility                                               Conditioning/ROM  Transfers                                                     Bed Mobility  Sitting /Standing Balance                         Transfers                                        Gait Training                                               Sitting/Standing Balance  Stair Training [   ]Applicable                    Home equipment Eval                                                                        Splinting  [   ] Only      GOALS:   ADL   [  x  ]   Independent                    Transfers  [ x   ] Independent                          Ambulation  [ x   ] Independent     [   x  ] With device                            [    ]  CG                                                         [    ]  CG                                                                  [    ] CG                            [    ] Min A                                                   [    ] Min A                                                              [    ] Min  A          DISCHARGE PLAN:   [    ]  Good candidate for Intensive Rehabilitation/Hospital based                                             Will tolerate 3hrs Intensive Rehab Daily                                       [     ]  Short Term Rehab in Skilled Nursing Facility                                       [     ]  Home with Outpatient or  services                                         [ x    ]  Possible Candidate for Intensive Hospital based Rehab

## 2023-01-26 NOTE — ED PROVIDER NOTE - PROGRESS NOTE DETAILS
MM: Discussed case with Neurology, CT already showing signs of ischemia, will hold off on tNK for now to evaluate for possible thrombectomy. MM: Patient noted to have nausea despite zofran. Neuro attending at bedside to eval if still tNK candidate given hypodensity visualized on CT scan. MM: Decision made to give TNK. Dr. Arrington at bedside with Dr. Elaine. BP was 178/69 HR 74 100% O2. TNK Pushed at 13:03. MM: Decision made to give TNK. Dr. Arrington at bedside with Dr. Elaine. BP was 178/69 HR 74 100% O2. TNK Pushed at 13:03.    Repeat /78 HR 70

## 2023-01-26 NOTE — H&P ADULT - NSHPLABSRESULTS_GEN_ALL_CORE
Fingerstick Blood Glucose: CAPILLARY BLOOD GLUCOSE  113 (26 Jan 2023 13:52)      POCT Blood Glucose.: 113 mg/dL (26 Jan 2023 11:41)    LABS:                        12.8   5.91  )-----------( 127      ( 26 Jan 2023 11:45 )             38.6     01-26    140  |  104  |  17  ----------------------------<  104<H>  4.3   |  26  |  0.8    Ca    9.5      26 Jan 2023 11:45    TPro  7.0  /  Alb  4.4  /  TBili  0.6  /  DBili  x   /  AST  30  /  ALT  30  /  AlkPhos  111  01-26    PT/INR - ( 26 Jan 2023 11:45 )   PT: 13.10 sec;   INR: 1.14 ratio         PTT - ( 26 Jan 2023 11:45 )  PTT:32.8 sec  CARDIAC MARKERS ( 26 Jan 2023 11:45 )  x     / <0.01 ng/mL / x     / x     / x              < from: CT Brain Stroke Protocol (01.26.23 @ 11:58) >     No evidence of acute intracranial hemorrhage or large territory   infarct.    2.  Patchy white matter disease, mildly progressed 2016 and likely   reflecting chronic microvascular change.    3.  Stable chronic lacunar infarcts within the basal ganglia.      < end of copied text >    < from: CT Angio Neck Stroke Protocol w/ IV Cont (01.26.23 @ 12:42) >      Right carotid: Atheromatous plaque at the carotid bifurcation with   mild (<50%) stenosis of the distal CCA, severe (70-80%) stenosis of the   ICA originand moderate stenosis of the ECA origin.  2.  Left carotid: Calcific plaque at the left carotid bifurcation with   severe (70-80%) stenosis of the proximal ICA and moderate stenosis of the   proximal ECA.  3.  Vertebral arteries: Calcific plaque at the left vertebral artery   origin with likely moderate-severe stenosis. Scattered mild-moderate   stenoses bilaterally.    CTA head-  1.  Calcific plaque of the carotid siphons with moderate stenoses   bilaterally. The anterior and middle cerebral arteries are patent.  2.  Moderate stenoses of the left MCA M1 segment.  3.  Moderate-severe calcific stenosis of the V4 segment of the left   vertebral artery.  4.  Severe stenosis or occlusion of the distal right PCA.    Perfusion- Small perfusion abnormality (penumbra) in the right occipital   lobe.    Other-  1.  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  Correlate clinically.  2.  Polypoid opacities within the nasal cavity.    < end of copied text >

## 2023-01-26 NOTE — H&P ADULT - HISTORY OF PRESENT ILLNESS
73 years old male with PMH of HTN, HLD presented to Ed with sudden onset of loss of vision this morning. he was doing well until 9:00 am when he suddenly noticed he developed blurry vision in both eyes . He also noticed that he was feeling dizzy at the time. No H/O fall or recent head trauma. Denies any slurred speech, trouble speaking, weakness. He never had similar symptoms in the past. N H/O stroke in the past.

## 2023-01-26 NOTE — ED PROVIDER NOTE - CLINICAL SUMMARY MEDICAL DECISION MAKING FREE TEXT BOX
73-year-old male with a past medical history of hypertension hyperlipidemia presents to the ED complaining of blurred/loss of vision bilateral eyes left greater than right that began at 9 AM states woke up at 4 AM was fine and suddenly noticed this when he was going to the bathroom stroke code activated upon arrival fingerstick 113 vs reviewed labs imaging ekg obtained and reviewed + stroke, TNK administered at 13:03. Patient admitted to stroke unit

## 2023-01-26 NOTE — SWALLOW BEDSIDE ASSESSMENT ADULT - SLP PERTINENT HISTORY OF CURRENT PROBLEM
73-year-old male with a past medical history of HTN & HLD presents to the ED complaining of blurred/loss of vision bilateral eyes left greater than right that began at 9 AM. stroke code activated upon arrival, NIH 3 for vision and ataxia.

## 2023-01-26 NOTE — ED PROVIDER NOTE - PHYSICAL EXAMINATION
Vital Signs: I have reviewed the initial vital signs.  Constitutional: well-nourished, appears stated age, no acute distress.  HEENT: Airway patent, moist MM, EOMI,   CV: regular rate, regular rhythm, well-perfused extremities,   Lungs: Clear to ascultation bilaterally, no increased work of breathing.  ABD: Non-tender, Non-distended, no flank pain.   MSK: Neck supple, nontender, normal range of motion,   INTEG: Skin warm, dry, no rash.  NEURO: AAO x 3, normal speech, no facial asymmetry, dysmetria, BL peripheral visual field loss.   PSYCH: Calm, cooperative, normal affect and interaction.

## 2023-01-26 NOTE — ED PROVIDER NOTE - OBJECTIVE STATEMENT
Patient is 73-year-old male past medical history hypertension hyperlipidemia presenting for evaluation of blurry vision in both eyes.  He was performing his morning routine when at 9 AM noticed blurry vision of both eyes but L  greater than R.  Symptoms are still persistent at this time, fingerstick 113, so code stroke called.

## 2023-01-26 NOTE — CONSULT NOTE ADULT - SUBJECTIVE AND OBJECTIVE BOX
HPI:  73 years old male with PMH of HTN, HLD presented to Ed with sudden onset of loss of vision this morning. he was doing well until 9:00 am when he suddenly noticed he developed blurry vision in both eyes . He also noticed that he was feeling dizzy at the time. No H/O fall or recent head trauma. Denies any slurred speech, trouble speaking, weakness. He never had similar symptoms in the past. N H/O stroke in the past.     < from: CT Brain Stroke Protocol (01.26.23 @ 11:58) >     No evidence of acute intracranial hemorrhage or large territory   infarct.    2.  Patchy white matter disease, mildly progressed 2016 and likely   reflecting chronic microvascular change.    3.  Stable chronic lacunar infarcts within the basal ganglia.      < end of copied text >    < from: CT Angio Neck Stroke Protocol w/ IV Cont (01.26.23 @ 12:42) >      Right carotid: Atheromatous plaque at the carotid bifurcation with   mild (<50%) stenosis of the distal CCA, severe (70-80%) stenosis of the   ICA originand moderate stenosis of the ECA origin.  2.  Left carotid: Calcific plaque at the left carotid bifurcation with   severe (70-80%) stenosis of the proximal ICA and moderate stenosis of the   proximal ECA.  3.  Vertebral arteries: Calcific plaque at the left vertebral artery   origin with likely moderate-severe stenosis. Scattered mild-moderate   stenoses bilaterally.    CTA head-  1.  Calcific plaque of the carotid siphons with moderate stenoses   bilaterally. The anterior and middle cerebral arteries are patent.  2.  Moderate stenoses of the left MCA M1 segment.  3.  Moderate-severe calcific stenosis of the V4 segment of the left   vertebral artery.  4.  Severe stenosis or occlusion of the distal right PCA.  Perfusion- Small perfusion abnormality (penumbra) in the right occipital   lobe.    Other-  1.  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  Correlate clinically.  2.  Polypoid opacities within the nasal cavity.    Medical charts / labs / imaging studies reviewed       PAST MEDICAL & SURGICAL HISTORY:  Non-ST elevation (NSTEMI) myocardial infarction      Kidney calculi      Abdominal aortic aneurysm (AAA) without rupture      HTN (hypertension)      HLD (hyperlipidemia)      Aortic stenosis      2019 novel coronavirus disease (COVID-19)  5/2022      No significant past surgical history          Hospital Course:    TODAY'S SUBJECTIVE & REVIEW OF SYMPTOMS:     Constitutional WNL   Cardio WNL   Resp WNL   GI WNL  Heme WNL  Endo WNL  Skin WNL  MSK WNL  Neuro vision change   Cognitive WNL  Psych WNL      MEDICATIONS  (STANDING):  atorvastatin 80 milliGRAM(s) Oral at bedtime    MEDICATIONS  (PRN):  albuterol    90 MICROgram(s) HFA Inhaler 2 Puff(s) Inhalation every 6 hours PRN Shortness of Breath and/or Wheezing      FAMILY HISTORY:  FH: CAD (coronary artery disease) (Father, Sibling)        Allergies    No Known Allergies    Intolerances        SOCIAL HISTORY:    [  ] Etoh  [  ] Smoking  [  ] Substance abuse     Home Environment:  [ x  ] Home Alone  [   ] Lives with Family  [   ] Home Health Aid    Dwelling:  [   ] Apartment  [ x  ] Private House  [   ] Adult Home  [   ] Skilled Nursing Facility      [   ] Short Term  [   ] Long Term  [x   ] Stairs       Elevator [   ]    FUNCTIONAL STATUS PTA: (Check all that apply)  Ambulation: [  x  ]Independent    [   ] Dependent     [   ] Non-Ambulatory  Assistive Device: [   ] SA Cane  [   ]  Q Cane  [   ] Walker  [   ]  Wheelchair  ADL : [ x  ] Independent  [    ]  Dependent       Vital Signs Last 24 Hrs  T(C): 36.8 (26 Jan 2023 12:00), Max: 36.8 (26 Jan 2023 12:00)  T(F): 98.2 (26 Jan 2023 12:00), Max: 98.2 (26 Jan 2023 12:00)  HR: 54 (26 Jan 2023 15:48) (54 - 74)  BP: 177/81 (26 Jan 2023 15:48) (154/83 - 196/75)  BP(mean): --  RR: 16 (26 Jan 2023 15:48) (16 - 18)  SpO2: 98% (26 Jan 2023 15:48) (98% - 100%)    Parameters below as of 26 Jan 2023 15:48  Patient On (Oxygen Delivery Method): room air          PHYSICAL EXAM: Awake & Alert  GENERAL: NAD  HEAD:  Normocephalic  CHEST/LUNG: Clear   HEART: S1S2+  ABDOMEN: Soft, Nontender  EXTREMITIES:  no calf tenderness    NERVOUS SYSTEM:  Cranial Nerves 2-12 intact [ x  ] Abnormal  [   ]  ROM: WFL all extremities [ x  ]  Abnormal [   ]  Motor Strength: WFL all extremities  [  x ]  Abnormal [   ]  Sensation: intact to light touch [ x  ] Abnormal [   ]    FUNCTIONAL STATUS:  Bed Mobility: Independent [   ]  Supervision [   ]  Needs Assistance [  x ]  N/A [   ]  Transfers: Independent [   ]  Supervision [   ]  Needs Assistance [  x ]  N/A [   ]   Ambulation: Independent [   ]  Supervision [   ]  Needs Assistance [   ]  N/A [   ]  ADL: Independent [   ] Requires Assistance [ x  ] N/A [   ]      LABS:                        12.8   5.91  )-----------( 127      ( 26 Jan 2023 11:45 )             38.6     01-26    140  |  104  |  17  ----------------------------<  104<H>  4.3   |  26  |  0.8    Ca    9.5      26 Jan 2023 11:45    TPro  7.0  /  Alb  4.4  /  TBili  0.6  /  DBili  x   /  AST  30  /  ALT  30  /  AlkPhos  111  01-26    PT/INR - ( 26 Jan 2023 11:45 )   PT: 13.10 sec;   INR: 1.14 ratio         PTT - ( 26 Jan 2023 11:45 )  PTT:32.8 sec      RADIOLOGY & ADDITIONAL STUDIES:

## 2023-01-26 NOTE — SWALLOW BEDSIDE ASSESSMENT ADULT - SLP GENERAL OBSERVATIONS
Pt received sitting upright in bed with family at bedside. Pt denies dysphagia. Makes wants/needs known w/o difficulty, A&Ox4. Voice WNL. Reports headache, RN aware.

## 2023-01-26 NOTE — H&P ADULT - NSHPPHYSICALEXAM_GEN_ALL_CORE
Physical exam:  Constitutional: No acute distress, conversant  Eyes: Anicteric sclerae, moist conjunctivae, see below for CNs  Neck: trachea midline, FROM, supple,   Cardiovascular: Regular rate and rhythm, no murmurs,   Pulmonary: Anterior breath sounds clear bilaterally,   GI: Abdomen soft, non-distended, non-tender      Neurologic:  -Mental status: Awake, alert, oriented to person, place, and time. Speech is fluent with intact naming, repetition, and comprehension, no dysarthria. Follows commands. Attention/concentration intact.  Cranial nerve:  II: left complete hemianopia  III, IV, VI: Extraocular movements are intact without nystagmus. Pupils equally round and reactive to light  V:  Facial sensation V1-V3 equal and intact   VII: Face is symmetric with normal eye closure and smile  XII: Tongue protrudes midline  Motor: Normal bulk and tone. No pronator drift. Strength bilateral upper extremity 5/5, bilateral lower extremities 5/5.  Rapid alternating movements intact and symmetric  Sensation: Intact to light touch bilaterally. No neglect or extinction on double simultaneous testing.  Coordination: left dysmetria on FTN      NIHSS: 3

## 2023-01-26 NOTE — ED PROVIDER NOTE - ATTENDING CONTRIBUTION TO CARE
73-year-old male with a past medical history of hypertension hyperlipidemia presents to the ED complaining of blurred/loss of vision bilateral eyes left greater than right that began at 9 AM states woke up at 4 AM was fine and suddenly noticed this when he was going to the bathroom stroke code activated upon arrival fingerstick 113  on exam  CONSTITUTIONAL: WA / WN / NAD  HEAD: NCAT  EYES: PERRL; EOMI;   ENT: Normal pharynx; mucous membranes pink/moist, no erythema.  NECK: Supple; no meningeal signs  CARD: RRR; nl S1/S2; no M/R/G.   RESP: Respiratory rate and effort are normal; breath sounds clear and equal bilaterally.  ABD: Soft, NT ND   MSK/EXT: No gross deformities; full range of motion.  SKIN: Warm and dry;   NEURO: AAOx3, Motor 5/5 x 4 extremities b/l  visual field loss & dysmetria no dysarthria no facial droop NIH 5  PSYCH: Memory Intact, Normal Affect

## 2023-01-26 NOTE — H&P ADULT - ASSESSMENT
MR. Mohr is a 73 years old male with PMH of HTN, HLD presented to ED with sudden onset of loss of vision in left eye started 2.5 hours prior to arrival to ED. Stroke code was activated, NIHSS: 3.   Risk and benefits of alteplase were discussed with patient, but not limited to, risks of of symptomatic ICH and death up to 6.4%. Decision was made that benefits outweighed risks and Tenectaplase was administered.   Patient is being admitted to stroke unit.  # Neuro: Acute right PCA territory infarct:  - Please perform dysphagia screen now   - Once dysphagia screen passed, start atorvastatin 80 once daily  - Notify provider if patient passes dysphagia screen able to have diet if no pending intervention  - Keep BP <180/105, notify provider if out of range.   - Check every 15 minutes for first hour after infusion is stopped; every 30 minutes for the next 6 hours; hourly until 24 hours from end of infusion.   - Minimize arterial and venous punctures, able to draw blood 4hr s/p tenectaplase  - Keep temp <100F and maintain glucose 140-180.   - Notify provider for "HR >100 or <55 or SaO2 <95%"  - Maintain strict bed rest for 12 hours with HOB <30 degrees  - After 12hr s/p alteplase, patient able to ambulate with assist  - Repeat CT head with any acute change in mental status.   - No antiplatelet, anticoagulation, and heparin sq until 24 hour CT head negative for bleed.   - Repeat CT head noncon 24 hours post-alteplase to rule out bleed  - MRI brain without contrast   - echo   - PT/OT order  - Swallow evaluation if fails dysphagia screen  - Stroke education  - DVT ppx - SCDs (NO heparin SQ as post alteplase protocol)      Cards  #HTN  - permissive hypertension, Goal -180  - hold home blood pressure medication for now  - obtain TTE   - Stroke Code EKG Results:  Diagnosis Line Normal sinus rhythm  Left ventricular hypertrophy with QRS widening and repolarization abnormality        #HLD  - high dose statin as above in CVA  - LDL results: pending    Pulm  - call provider if SPO2 < 94%    GI  #Nutrition/Fluids/Electrolytes   - replete K<4 and Mg <2  - Diet:: NPO for now  - IVF: IF BPow can start NS 0.9%    Renal  - daily BMP    Infectious Disease  - Stroke Code CXR results: pending    Endocrine  #DM  - A1C results: pending  - ISS none    - TSH results: pending    DVT Prophylaxis  - SCDs for DVT prophylaxis     Code status: fullcode  Dispo: stroke unit    Discussed with Neurology Attending

## 2023-01-27 ENCOUNTER — TRANSCRIPTION ENCOUNTER (OUTPATIENT)
Age: 73
End: 2023-01-27

## 2023-01-27 LAB
A1C WITH ESTIMATED AVERAGE GLUCOSE RESULT: 5.6 % — SIGNIFICANT CHANGE UP (ref 4–5.6)
ANION GAP SERPL CALC-SCNC: 5 MMOL/L — LOW (ref 7–14)
BASOPHILS # BLD AUTO: 0.03 K/UL — SIGNIFICANT CHANGE UP (ref 0–0.2)
BASOPHILS NFR BLD AUTO: 0.4 % — SIGNIFICANT CHANGE UP (ref 0–1)
BUN SERPL-MCNC: 14 MG/DL — SIGNIFICANT CHANGE UP (ref 10–20)
CALCIUM SERPL-MCNC: 9.1 MG/DL — SIGNIFICANT CHANGE UP (ref 8.4–10.4)
CHLORIDE SERPL-SCNC: 106 MMOL/L — SIGNIFICANT CHANGE UP (ref 98–110)
CHOLEST SERPL-MCNC: 115 MG/DL — SIGNIFICANT CHANGE UP
CO2 SERPL-SCNC: 28 MMOL/L — SIGNIFICANT CHANGE UP (ref 17–32)
CREAT SERPL-MCNC: 0.7 MG/DL — SIGNIFICANT CHANGE UP (ref 0.7–1.5)
EGFR: 97 ML/MIN/1.73M2 — SIGNIFICANT CHANGE UP
EOSINOPHIL # BLD AUTO: 0.36 K/UL — SIGNIFICANT CHANGE UP (ref 0–0.7)
EOSINOPHIL NFR BLD AUTO: 5.4 % — SIGNIFICANT CHANGE UP (ref 0–8)
ESTIMATED AVERAGE GLUCOSE: 114 MG/DL — SIGNIFICANT CHANGE UP (ref 68–114)
GLUCOSE SERPL-MCNC: 97 MG/DL — SIGNIFICANT CHANGE UP (ref 70–99)
HCT VFR BLD CALC: 37.1 % — LOW (ref 42–52)
HCV AB S/CO SERPL IA: 0.04 COI — SIGNIFICANT CHANGE UP
HCV AB SERPL-IMP: SIGNIFICANT CHANGE UP
HDLC SERPL-MCNC: 67 MG/DL — SIGNIFICANT CHANGE UP
HGB BLD-MCNC: 12.4 G/DL — LOW (ref 14–18)
IMM GRANULOCYTES NFR BLD AUTO: 0.1 % — SIGNIFICANT CHANGE UP (ref 0.1–0.3)
LIPID PNL WITH DIRECT LDL SERPL: 30 MG/DL — SIGNIFICANT CHANGE UP
LYMPHOCYTES # BLD AUTO: 1.35 K/UL — SIGNIFICANT CHANGE UP (ref 1.2–3.4)
LYMPHOCYTES # BLD AUTO: 20.1 % — LOW (ref 20.5–51.1)
MCHC RBC-ENTMCNC: 32.5 PG — HIGH (ref 27–31)
MCHC RBC-ENTMCNC: 33.4 G/DL — SIGNIFICANT CHANGE UP (ref 32–37)
MCV RBC AUTO: 97.4 FL — HIGH (ref 80–94)
MONOCYTES # BLD AUTO: 0.68 K/UL — HIGH (ref 0.1–0.6)
MONOCYTES NFR BLD AUTO: 10.1 % — HIGH (ref 1.7–9.3)
NEUTROPHILS # BLD AUTO: 4.28 K/UL — SIGNIFICANT CHANGE UP (ref 1.4–6.5)
NEUTROPHILS NFR BLD AUTO: 63.9 % — SIGNIFICANT CHANGE UP (ref 42.2–75.2)
NON HDL CHOLESTEROL: 48 MG/DL — SIGNIFICANT CHANGE UP
NRBC # BLD: 0 /100 WBCS — SIGNIFICANT CHANGE UP (ref 0–0)
PLATELET # BLD AUTO: 123 K/UL — LOW (ref 130–400)
POTASSIUM SERPL-MCNC: 4.2 MMOL/L — SIGNIFICANT CHANGE UP (ref 3.5–5)
POTASSIUM SERPL-SCNC: 4.2 MMOL/L — SIGNIFICANT CHANGE UP (ref 3.5–5)
RBC # BLD: 3.81 M/UL — LOW (ref 4.7–6.1)
RBC # FLD: 13.2 % — SIGNIFICANT CHANGE UP (ref 11.5–14.5)
SODIUM SERPL-SCNC: 139 MMOL/L — SIGNIFICANT CHANGE UP (ref 135–146)
TRIGL SERPL-MCNC: 90 MG/DL — SIGNIFICANT CHANGE UP
TSH SERPL-MCNC: 0.94 UIU/ML — SIGNIFICANT CHANGE UP (ref 0.27–4.2)
WBC # BLD: 6.71 K/UL — SIGNIFICANT CHANGE UP (ref 4.8–10.8)
WBC # FLD AUTO: 6.71 K/UL — SIGNIFICANT CHANGE UP (ref 4.8–10.8)

## 2023-01-27 PROCEDURE — 93306 TTE W/DOPPLER COMPLETE: CPT | Mod: 26

## 2023-01-27 RX ORDER — ASPIRIN/CALCIUM CARB/MAGNESIUM 324 MG
81 TABLET ORAL DAILY
Refills: 0 | Status: DISCONTINUED | OUTPATIENT
Start: 2023-01-27 | End: 2023-01-30

## 2023-01-27 RX ORDER — CLOPIDOGREL BISULFATE 75 MG/1
75 TABLET, FILM COATED ORAL DAILY
Refills: 0 | Status: DISCONTINUED | OUTPATIENT
Start: 2023-01-27 | End: 2023-01-30

## 2023-01-27 RX ORDER — ENOXAPARIN SODIUM 100 MG/ML
40 INJECTION SUBCUTANEOUS EVERY 24 HOURS
Refills: 0 | Status: DISCONTINUED | OUTPATIENT
Start: 2023-01-27 | End: 2023-01-29

## 2023-01-27 RX ADMIN — ATORVASTATIN CALCIUM 80 MILLIGRAM(S): 80 TABLET, FILM COATED ORAL at 21:18

## 2023-01-27 RX ADMIN — Medication 81 MILLIGRAM(S): at 14:48

## 2023-01-27 RX ADMIN — CLOPIDOGREL BISULFATE 75 MILLIGRAM(S): 75 TABLET, FILM COATED ORAL at 14:48

## 2023-01-27 RX ADMIN — ENOXAPARIN SODIUM 40 MILLIGRAM(S): 100 INJECTION SUBCUTANEOUS at 14:48

## 2023-01-27 NOTE — PATIENT PROFILE ADULT - FALL HARM RISK - HARM RISK INTERVENTIONS

## 2023-01-27 NOTE — PROGRESS NOTE ADULT - ASSESSMENT
73 years old male with PMH of HTN, HLD presented to ED with sudden onset of loss of vision in left eye started 2.5 hours prior to arrival to ED. s/p Tenectaplase on 01/26  Patient admitted to stroke unit. His 24 hr post TNK imaging is stable    # Neuro: Acute right PCA territory infarct:  - Start ASA 81 mg Daily  - Start Plavix 75 mg daily  - MRI reviewed  - Stroke education.    Cards  #HTN  - permissive hypertension, Goal -180  - hold home blood pressure medication for now  - obtain TTE   - Stroke Code EKG Results:  Diagnosis Line Normal sinus rhythm  Left ventricular hypertrophy with QRS widening and repolarization abnormality  - Cardiology consult for KEILA          #HLD  - high dose statin as above in CVA  - LDL results: 30    Pulm  - call provider if SPO2 < 94%    GI  #Nutrition/Fluids/Electrolytes   - replete K<4 and Mg <2  - Diet::DASh/TLC  - IVF: IF BPow can start NS 0.9%    Renal  - daily BMP    Infectious Disease  - Stroke Code CXR results: pending    Endocrine  #DM  - A1C results: 5.7  - ISS none    - TSH results: 0.94      Heme:  - Thrombocytopania:  - Plt:123  - f/u daily PLT count    DVT Prophylaxis  - Lovenox 40 mgs/c daily    Code status: fullcode  Dispo: stroke unit    Discussed with Neurology Attending

## 2023-01-27 NOTE — PROGRESS NOTE ADULT - SUBJECTIVE AND OBJECTIVE BOX
Neurology Stroke Progress Note    INTERVAL HPI/OVERNIGHT EVENTS:  Patient seen and examined.     MEDICATIONS  (STANDING):  aspirin enteric coated 81 milliGRAM(s) Oral daily  atorvastatin 80 milliGRAM(s) Oral at bedtime  clopidogrel Tablet 75 milliGRAM(s) Oral daily  enoxaparin Injectable 40 milliGRAM(s) SubCutaneous every 24 hours    MEDICATIONS  (PRN):  albuterol    90 MICROgram(s) HFA Inhaler 2 Puff(s) Inhalation every 6 hours PRN Shortness of Breath and/or Wheezing      Allergies    No Known Allergies    Intolerances        Vital Signs Last 24 Hrs  T(C): 36.9 (27 Jan 2023 08:52), Max: 37 (26 Jan 2023 20:30)  T(F): 98.4 (27 Jan 2023 08:52), Max: 98.6 (26 Jan 2023 20:30)  HR: 43 (27 Jan 2023 13:00) (40 - 78)  BP: 161/71 (27 Jan 2023 13:00) (124/61 - 177/81)  BP(mean): 89 (27 Jan 2023 11:00) (88 - 114)  RR: 19 (27 Jan 2023 13:00) (16 - 20)  SpO2: 99% (27 Jan 2023 13:00) (96% - 100%)    Parameters below as of 27 Jan 2023 13:00  Patient On (Oxygen Delivery Method): room air        Physical exam:  General: No acute distress, awake and alert  Eyes: Anicteric sclerae, moist conjunctivae, see below for CNs  Neck: trachea midline, FROM, supple,   Cardiovascular: Regular rate and rhythm, no murmurs,  Pulmonary: Anterior breath sounds clear bilaterally,   GI: Abdomen soft, non-distended, non-tender      Neurologic:  Mental status: Awake, alert, oriented to person, place, and time. Speech is fluent with intact naming, repetition, and comprehension, no dysarthria. Follows commands. Attention/concentration intact.  Cranial nerve:  II: left complete hemianopia  III, IV, VI: Extraocular movements are intact without nystagmus. Pupils equally round and reactive to light  V:  Facial sensation V1-V3 equal and intact   VII: Face is symmetric with normal eye closure and smile  XII: Tongue protrudes midline  Motor: Normal bulk and tone. No pronator drift. Strength bilateral upper extremity 5/5, bilateral lower extremities 5/5.  Rapid alternating movements intact and symmetric  Sensation: Intact to light touch bilaterally. No neglect or extinction on double simultaneous testing.  Coordination: No dysmetria on B/L FTN    LABS:                        12.4   6.71  )-----------( 123      ( 27 Jan 2023 07:51 )             37.1     01-27    139  |  106  |  14  ----------------------------<  97  4.2   |  28  |  0.7    Ca    9.1      27 Jan 2023 07:51    TPro  7.0  /  Alb  4.4  /  TBili  0.6  /  DBili  x   /  AST  30  /  ALT  30  /  AlkPhos  111  01-26    PT/INR - ( 26 Jan 2023 11:45 )   PT: 13.10 sec;   INR: 1.14 ratio         PTT - ( 26 Jan 2023 11:45 )  PTT:32.8 sec      RADIOLOGY & ADDITIONAL TESTS:  < from: MR Head No Cont (01.26.23 @ 22:13) >  Acute right PCA territory infarct involving the occipital lobe with trace   petechial hemorrhagic transformation.    Moderate chronic microvascular type changes as well as a chronic right   basal ganglia lacunar infarct.

## 2023-01-27 NOTE — PATIENT PROFILE ADULT - FUNCTIONAL ASSESSMENT - BASIC MOBILITY 6.
3-calculated by average/Not able to assess (calculate score using [facility] averaging method)

## 2023-01-27 NOTE — PHYSICAL THERAPY INITIAL EVALUATION ADULT - ADDITIONAL COMMENTS
Pt lives alone in house with 1 FOS to get inside, no hx of falls in the last year but reports significantly decreased endurance over the last year, requiring standing rest break after negotiating 1/2 flight of stairs and after ambulating <1 block.

## 2023-01-27 NOTE — DISCHARGE NOTE NURSING/CASE MANAGEMENT/SOCIAL WORK - PATIENT PORTAL LINK FT
You can access the FollowMyHealth Patient Portal offered by [facility] by registering at the following website: [email or web address]. By joining [facility]’s FollowMyHealth portal, you will also be able to view your health information using other applications (apps) compatible with our system.

## 2023-01-27 NOTE — PHYSICAL THERAPY INITIAL EVALUATION ADULT - GENERAL OBSERVATIONS, REHAB EVAL
[phone] Pt received semifowlers in bed, left sitting EOB with OT Mary Lou pt agreeable to PT session +tele +SpO2 +bp cuff

## 2023-01-27 NOTE — PATIENT PROFILE ADULT - NSTRANSFEREYEGLASSESPAIRS_GEN_A_NUR
[facility]  [address]  [place] [address]  Phone: [phone]                  Migdalia Newell   3/20/2017 3:30 PM   Office Visit    Description:  Female : 2017   Provider:  Stella Worthy MD   Department:  [facility]           Reason for Visit     Eye Drainage           Diagnoses this Visit        Comments    Conjunctivitis, unspecified conjunctivitis type, unspecified laterality    -  Primary            To Do List           Goals (5 Years of Data)     None       These Medications        Disp Refills Start End    moxifloxacin (VIGAMOX) 0.5 % ophthalmic solution 3 mL 0 3/20/2017 3/27/2017    Place 1 drop into both eyes 3 (three) times daily. - Both Eyes    Pharmacy: [facility] [address] [facility], [place] - [address] AT [facility] #: [phone]         [facility] On Call     [facility] On Call Nurse Care Line -  Assistance  Registered nurses in the [facility] provide clinical advisement, health education, appointment booking, and other advisory services.  Call for this free service at [phone].             Medications           Message regarding Medications     Verify the changes and/or additions to your medication regime listed below are the same as discussed with your clinician today.  If any of these changes or additions are incorrect, please notify your healthcare provider.        START taking these NEW medications        Refills    moxifloxacin (VIGAMOX) 0.5 % ophthalmic solution 0    Sig: Place 1 drop into both eyes 3 (three) times daily.    Class: Normal    Route: Both Eyes      STOP taking these medications     albuterol 90 mcg/actuation inhaler Inhale 2 puffs into the lungs every 4 (four) hours as needed for Wheezing.    inhalation device (BREATHERITE SPACER-MASK,INFANT) Use as directed for inhalation.           Verify that the below list of medications is an accurate representation of the  "medications you are currently taking.  If none reported, the list may be blank. If incorrect, please contact your healthcare provider. Carry this list with you in case of emergency.           Current Medications     acetaminophen (TYLENOL) 160 mg/5 mL Liqd Take by mouth.    moxifloxacin (VIGAMOX) 0.5 % ophthalmic solution Place 1 drop into both eyes 3 (three) times daily.           Clinical Reference Information           Your Vitals Were     Temp Height Weight BMI       97.7 °F (36.5 °C) (Axillary) 2' 4.54" (0.725 m) 8.964 kg (19 lb 12.2 oz) 17.05 kg/m2       Allergies as of 3/20/2017     Augmentin [Amoxicillin-pot Clavulanate]      Immunizations Administered on Date of Encounter - 3/20/2017     None      Instructions    antibiotic drops to eyes for 7 days  If not improving or worsening then return to clinic  No longer contagious after 24 hours on antibiotics.   Discussed contagiousness       Language Assistance Services     ATTENTION: Language assistance services are available, free of charge. Please call [phone].      ATENCIÓN: Si habla dickjuventino, tiene a solorzano disposición servicios gratuitos de asistencia lingüística. Llame al [phone].     JOSE R Ý: N?u b?n nói Ti?ng Vi?t, có các d?ch v? h? tr? ngôn ng? mi?n phí dành cho b?n. G?i s? [phone].         [facility] complies with applicable Federal civil rights laws and does not discriminate on the basis of race, color, national origin, age, disability, or sex.        " 1 pair

## 2023-01-28 LAB
ANION GAP SERPL CALC-SCNC: 7 MMOL/L — SIGNIFICANT CHANGE UP (ref 7–14)
BUN SERPL-MCNC: 17 MG/DL — SIGNIFICANT CHANGE UP (ref 10–20)
CALCIUM SERPL-MCNC: 8.9 MG/DL — SIGNIFICANT CHANGE UP (ref 8.4–10.4)
CHLORIDE SERPL-SCNC: 104 MMOL/L — SIGNIFICANT CHANGE UP (ref 98–110)
CO2 SERPL-SCNC: 26 MMOL/L — SIGNIFICANT CHANGE UP (ref 17–32)
CREAT SERPL-MCNC: 0.6 MG/DL — LOW (ref 0.7–1.5)
EGFR: 101 ML/MIN/1.73M2 — SIGNIFICANT CHANGE UP
GLUCOSE SERPL-MCNC: 98 MG/DL — SIGNIFICANT CHANGE UP (ref 70–99)
HCT VFR BLD CALC: 37.5 % — LOW (ref 42–52)
HGB BLD-MCNC: 12.6 G/DL — LOW (ref 14–18)
MCHC RBC-ENTMCNC: 32.3 PG — HIGH (ref 27–31)
MCHC RBC-ENTMCNC: 33.6 G/DL — SIGNIFICANT CHANGE UP (ref 32–37)
MCV RBC AUTO: 96.2 FL — HIGH (ref 80–94)
NRBC # BLD: 0 /100 WBCS — SIGNIFICANT CHANGE UP (ref 0–0)
PLATELET # BLD AUTO: 116 K/UL — LOW (ref 130–400)
POTASSIUM SERPL-MCNC: 4.2 MMOL/L — SIGNIFICANT CHANGE UP (ref 3.5–5)
POTASSIUM SERPL-SCNC: 4.2 MMOL/L — SIGNIFICANT CHANGE UP (ref 3.5–5)
RBC # BLD: 3.9 M/UL — LOW (ref 4.7–6.1)
RBC # FLD: 13.1 % — SIGNIFICANT CHANGE UP (ref 11.5–14.5)
SARS-COV-2 RNA SPEC QL NAA+PROBE: SIGNIFICANT CHANGE UP
SODIUM SERPL-SCNC: 137 MMOL/L — SIGNIFICANT CHANGE UP (ref 135–146)
WBC # BLD: 6.83 K/UL — SIGNIFICANT CHANGE UP (ref 4.8–10.8)
WBC # FLD AUTO: 6.83 K/UL — SIGNIFICANT CHANGE UP (ref 4.8–10.8)

## 2023-01-28 PROCEDURE — 99223 1ST HOSP IP/OBS HIGH 75: CPT

## 2023-01-28 PROCEDURE — 99233 SBSQ HOSP IP/OBS HIGH 50: CPT | Mod: GC

## 2023-01-28 PROCEDURE — 99223 1ST HOSP IP/OBS HIGH 75: CPT | Mod: 57

## 2023-01-28 RX ORDER — SENNA PLUS 8.6 MG/1
2 TABLET ORAL AT BEDTIME
Refills: 0 | Status: DISCONTINUED | OUTPATIENT
Start: 2023-01-28 | End: 2023-01-30

## 2023-01-28 RX ORDER — MAGNESIUM OXIDE 400 MG ORAL TABLET 241.3 MG
400 TABLET ORAL
Refills: 0 | Status: DISCONTINUED | OUTPATIENT
Start: 2023-01-28 | End: 2023-01-30

## 2023-01-28 RX ORDER — POLYETHYLENE GLYCOL 3350 17 G/17G
17 POWDER, FOR SOLUTION ORAL DAILY
Refills: 0 | Status: DISCONTINUED | OUTPATIENT
Start: 2023-01-28 | End: 2023-01-30

## 2023-01-28 RX ADMIN — Medication 81 MILLIGRAM(S): at 11:03

## 2023-01-28 RX ADMIN — POLYETHYLENE GLYCOL 3350 17 GRAM(S): 17 POWDER, FOR SOLUTION ORAL at 11:36

## 2023-01-28 RX ADMIN — MAGNESIUM OXIDE 400 MG ORAL TABLET 400 MILLIGRAM(S): 241.3 TABLET ORAL at 16:41

## 2023-01-28 RX ADMIN — ATORVASTATIN CALCIUM 80 MILLIGRAM(S): 80 TABLET, FILM COATED ORAL at 21:08

## 2023-01-28 RX ADMIN — CLOPIDOGREL BISULFATE 75 MILLIGRAM(S): 75 TABLET, FILM COATED ORAL at 11:03

## 2023-01-28 RX ADMIN — ENOXAPARIN SODIUM 40 MILLIGRAM(S): 100 INJECTION SUBCUTANEOUS at 13:10

## 2023-01-28 RX ADMIN — SENNA PLUS 2 TABLET(S): 8.6 TABLET ORAL at 11:42

## 2023-01-28 NOTE — CONSULT NOTE ADULT - SUBJECTIVE AND OBJECTIVE BOX
EDGAR HENAO  73y  Male    Patient is a 73y old  Male who presents with a chief complaint of sudden loss of vision (28 Jan 2023 06:55)      HPI:  73 years old male with PMH of CAD, HTN, HLD presented to Ed with sudden onset of loss of vision this morning. he was doing well until 9:00 am when he suddenly noticed he developed blurry vision in both eyes . He also noticed that he was feeling dizzy at the time. No H/O fall or recent head trauma. Denies any slurred speech, trouble speaking, weakness. He never had similar symptoms in the past. N H/O stroke in the past.  (26 Jan 2023 15:57)    S: Patient was examined and seen at bedside. This morning pt is resting comfortably in bed and reports no new issues or overnight events. No complaints, feels better but still can't see well on the left.  Denies CP, SOB, N/V/D/C/AP, cough, F, chills, dizziness, new focal weakness, HA, dysuria, or urinary symptoms, blood in stool.  ROS: all other systems reviewed and are negative    PAST MEDICAL & SURGICAL HISTORY:  Non-ST elevation (NSTEMI) myocardial infarction      Kidney calculi      Abdominal aortic aneurysm (AAA) without rupture      HTN (hypertension)      HLD (hyperlipidemia)      Aortic stenosis      2019 novel coronavirus disease (COVID-19)  5/2022      No significant past surgical history        SOCIAL HISTORY:  Tobacco: former smoker (quit 20yrs ago)  Illicit drugs: negative  Alcohol: social  Family history reviewed and otherwise non-contributory No clotting disorders, CVAs at early age.  ALLERGIES: NKDA    MEDICATIONS  (STANDING):  aspirin enteric coated 81 milliGRAM(s) Oral daily  atorvastatin 80 milliGRAM(s) Oral at bedtime  clopidogrel Tablet 75 milliGRAM(s) Oral daily  enoxaparin Injectable 40 milliGRAM(s) SubCutaneous every 24 hours  polyethylene glycol 3350 17 Gram(s) Oral daily  senna 2 Tablet(s) Oral at bedtime    MEDICATIONS  (PRN):  albuterol    90 MICROgram(s) HFA Inhaler 2 Puff(s) Inhalation every 6 hours PRN Shortness of Breath and/or Wheezing    Home Medications:  Albuterol (Eqv-ProAir HFA) 90 mcg/inh inhalation aerosol: 2 puff(s) inhaled every 6 hours, As Needed (21 Jul 2022 11:42)  amLODIPine 10 mg oral tablet: 1 tab(s) orally once a day (26 Jan 2023 16:04)  atorvastatin 40 mg oral tablet: 1 tab(s) orally once a day (21 Jul 2022 11:42)          Vital Signs Last 24 Hrs  T(C): 36.5 (28 Jan 2023 12:00), Max: 36.6 (27 Jan 2023 16:10)  T(F): 97.7 (28 Jan 2023 12:00), Max: 97.9 (27 Jan 2023 16:10)  HR: 48 (28 Jan 2023 12:00) (43 - 64)  BP: 127/64 (28 Jan 2023 12:00) (123/60 - 168/70)  BP(mean): 94 (28 Jan 2023 12:00) (88 - 113)  RR: 18 (28 Jan 2023 12:00) (18 - 19)  SpO2: 99% (28 Jan 2023 12:00) (97% - 99%)    Parameters below as of 28 Jan 2023 12:00  Patient On (Oxygen Delivery Method): room air      CAPILLARY BLOOD GLUCOSE          General: NAD. Chronically ill appearing  HEENT: clean oropharynx, EOMI, no LAD, Lt HH  Neck: trachea midline, no thyromegaly  CV: nl S1 S2; no m/r/g  Resp: decreased breath sounds at base  GI: NT/ND/S +BS  MS: no clubbing/cyanosis/edema, +pulses  Neuro: motor, sensory intact; + reflexes  Skin: no rashes, nl turgor  Psychiatric: AA0x3 w/ fair insight and judgement    tele: SR w/ freq PVCs, nonspecific changes (on my own evaluation of tele monitor)        LABS:                        12.6   6.83  )-----------( 116      ( 28 Jan 2023 05:41 )             37.5     01-28    137  |  104  |  17  ----------------------------<  98  4.2   |  26  |  0.6<L>    Ca    8.9      28 Jan 2023 05:41                      EKG - SR, nonspecific changes (my read)  Chart and consultant noted personally reviewed.  Care Discussed with Consultants/Other Providers/ Housestaff [ x] YES [ ] NO   Radiology, labs, old records personally reviewed.

## 2023-01-28 NOTE — CONSULT NOTE ADULT - SUBJECTIVE AND OBJECTIVE BOX
Patient is a 73y old  Male who presents with a chief complaint of sudden loss of vision (28 Jan 2023 12:33)    HPI:  73 years old male with PMH of HTN, HLD presented to Ed with sudden onset of loss of vision this morning. he was doing well until 9:00 am when he suddenly noticed he developed blurry vision in both eyes . He also noticed that he was feeling dizzy at the time. No H/O fall or recent head trauma. Denies any slurred speech, trouble speaking, weakness. He never had similar symptoms in the past. N H/O stroke in the past.  (26 Jan 2023 15:57)      EP consulted for loop implant.     REVIEW OF SYSTEMS    [x] A ten-point review of systems was otherwise negative except as noted.  [ ] Due to altered mental status/intubation, subjective information were not able to be obtained from the patient. History was obtained, to the extent possible, from review of the chart and collateral sources of information.      PAST MEDICAL & SURGICAL HISTORY:  Non-ST elevation (NSTEMI) myocardial infarction      Kidney calculi      Abdominal aortic aneurysm (AAA) without rupture      HTN (hypertension)      HLD (hyperlipidemia)      Aortic stenosis      2019 novel coronavirus disease (COVID-19)  5/2022      No significant past surgical history          Home Medications:  Albuterol (Eqv-ProAir HFA) 90 mcg/inh inhalation aerosol: 2 puff(s) inhaled every 6 hours, As Needed (21 Jul 2022 11:42)  amLODIPine 10 mg oral tablet: 1 tab(s) orally once a day (26 Jan 2023 16:04)  atorvastatin 40 mg oral tablet: 1 tab(s) orally once a day (21 Jul 2022 11:42)      Allergies:    No Known Allergies      FAMILY HISTORY:  FH: CAD (coronary artery disease) (Father, Sibling)        SOCIAL HISTORY:  CIGARETTES: denies  ALCOHOL: denies    MEDICATIONS  (STANDING):  aspirin enteric coated 81 milliGRAM(s) Oral daily  atorvastatin 80 milliGRAM(s) Oral at bedtime  clopidogrel Tablet 75 milliGRAM(s) Oral daily  enoxaparin Injectable 40 milliGRAM(s) SubCutaneous every 24 hours  magnesium oxide 400 milliGRAM(s) Oral three times a day with meals  polyethylene glycol 3350 17 Gram(s) Oral daily  senna 2 Tablet(s) Oral at bedtime    MEDICATIONS  (PRN):  albuterol    90 MICROgram(s) HFA Inhaler 2 Puff(s) Inhalation every 6 hours PRN Shortness of Breath and/or Wheezing      Vital Signs Last 24 Hrs  T(C): 36.5 (28 Jan 2023 12:00), Max: 36.6 (27 Jan 2023 16:10)  T(F): 97.7 (28 Jan 2023 12:00), Max: 97.9 (27 Jan 2023 16:10)  HR: 48 (28 Jan 2023 16:00) (46 - 64)  BP: 131/56 (28 Jan 2023 16:00) (123/60 - 168/70)  BP(mean): 82 (28 Jan 2023 16:00) (82 - 113)  RR: 18 (28 Jan 2023 16:00) (18 - 19)  SpO2: 97% (28 Jan 2023 16:00) (97% - 99%)    Parameters below as of 28 Jan 2023 16:00  Patient On (Oxygen Delivery Method): room air        PHYSICAL EXAM:    GENERAL: Elderly male,  In no apparent distress, well nourished, and hydrated.  HEART: Regular rate and rhythm; No murmurs, rubs, or gallops.  PULMONARY: Clear to auscultation and perfusion.  No rales, wheezing, or rhonchi bilaterally.  ABDOMEN: Soft, Nontender, Nondistended; Bowel sounds present  EXTREMITIES:  2+ Peripheral Pulses, No clubbing, cyanosis, or edema  NEUROLOGICAL: AO x4, SALCEDO, speech clear    INTERPRETATION OF TELEMETRY: SB 45 bpm    ECG:  < from: 12 Lead ECG (01.26.23 @ 13:24) >  Ventricular Rate 67 BPM    Atrial Rate 67 BPM    P-R Interval 138 ms    QRS Duration 138 ms    Q-T Interval 498 ms    QTC Calculation(Bazett) 526 ms    P Axis 31 degrees    R Axis 48 degrees    T Axis -12 degrees    Diagnosis Line Normal sinus rhythm  Left ventricular hypertrophy with QRS widening and repolarization abnormality  Cannot rule out Septal infarct , age undetermined  Abnormal ECG    < end of copied text >      I&O's Detail    27 Jan 2023 07:01  -  28 Jan 2023 07:00  --------------------------------------------------------  IN:  Total IN: 0 mL    OUT:    Voided (mL): 2 mL  Total OUT: 2 mL    Total NET: -2 mL          LABS:                        12.6   6.83  )-----------( 116      ( 28 Jan 2023 05:41 )             37.5     01-28    137  |  104  |  17  ----------------------------<  98  4.2   |  26  |  0.6<L>    Ca    8.9      28 Jan 2023 05:41        I&O's Detail    27 Jan 2023 07:01  -  28 Jan 2023 07:00  --------------------------------------------------------  IN:  Total IN: 0 mL    OUT:    Voided (mL): 2 mL  Total OUT: 2 mL    Total NET: -2 mL          RADIOLOGY:  < from: TTE Echo Complete w/o Contrast w/ Doppler (01.27.23 @ 13:16) >  Summary:   1. Normal global left ventricular systolic function.   2. LV Ejection Fraction by Fan's Method with a biplane EF of 65 %.   3. Mildly enlarged right ventricle. RV systolic function is normal.   4. Severe mitral annular calcification.   5. Moderate aortic valve stenosis with peak AV velocity 3.46 m/s, RACH   1.18 cm^2, and MG 25.0 mmHg.   6. Mild aortic regurgitation.   7. Mild tricuspid regurgitation.   8. Estimated pulmonary artery systolic pressure is 45.4 mmHg assuming a   right atrial pressure of 13 mmHg,which is consistent with mild pulmonary   hypertension.    PHYSICIAN INTERPRETATION:  Left Ventricle: The left ventricular internal cavity size is normal. Left   ventricular wall thickness is normal. Global LV systolic function was   normal.      LV Wall Scoring:  All segments are normal.    Right Ventricle: The right ventricular size is mildly enlarged. RV   systolic function is normal.  Left Atrium: Mildly enlarged left atrium.  Right Atrium: Mildly enlarged right atrium. RA Area is 19.35 cm² cm2.  Pericardium: There is no evidence of pericardial effusion.  Mitral Valve: Mild thickening of the anterior and posterior mitral valve   leaflets. There is severe mitral annular calcification. No evidence of   mitral stenosis. Trace mitral valve regurgitation is seen.  Tricuspid Valve: Structurally normal tricuspid valve, with normal leaflet   excursion. Mild tricuspid regurgitation is visualized. Estimated   pulmonary artery systolic pressure is 45.4 mmHg assuming a right atrial   pressure of 13 mmHg, which is consistent with mild pulmonary hypertension.  Aortic Valve: Moderate aortic stenosis is present. Mild aortic valve   regurgitation is seen.  Venous: The inferior vena cava was dilated, with respiratory size   variation less than 50%.  In comparison to the previous echocardiogram(s): There are no prior   studies on this patient for comparison purposes.    < end of copied text >    < from: MR Head No Cont (01.26.23 @ 22:13) >    IMPRESSION:  Examination is limited due to artifact potentially related to a metallic   densities in the frontal scalp.    Acute right PCA territory infarct involving the occipital lobe with trace   petechial hemorrhagic transformation.    Moderate chronic microvascular type changes as well as a chronic right   basal ganglia lacunar infarct.    < end of copied text >

## 2023-01-28 NOTE — CONSULT NOTE ADULT - TIME BILLING
time spent on review of labs, imaging studies, old records, obtaining history, personally examining patient, counselling and communicating with patient/ family, entering orders for medications/tests/etc, discussions with other health care providers, documentation in electronic health records, independent interpretation of labs, imaging/procedure results and care coordination.
Stroke

## 2023-01-28 NOTE — PROGRESS NOTE ADULT - ASSESSMENT
73 years old male with PMH of HTN, HLD presented to ED with sudden onset of loss of vision in left eye started 2.5 hours prior to arrival to ED. s/p Tenectaplase on 01/26  Patient admitted to stroke unit. His 24 hr post TNK imaging is stable    # Neuro: Acute right PCA territory infarct:  - Start ASA 81 mg Daily  - Start Plavix 75 mg daily  - MRI reviewed  - Stroke education.    Cards  #HTN  - permissive hypertension, Goal -180  - hold home blood pressure medication for now  - obtain TTE   - Stroke Code EKG Results:  Diagnosis Line Normal sinus rhythm  Left ventricular hypertrophy with QRS widening and repolarization abnormality  - Cardiology consult for KEILA          #HLD  - high dose statin as above in CVA  - LDL results: 30    Pulm  - call provider if SPO2 < 94%    GI  #Nutrition/Fluids/Electrolytes   - replete K<4 and Mg <2  - Diet::DASh/TLC  - IVF: IF BPow can start NS 0.9%    Renal  - daily BMP    Infectious Disease  - Stroke Code CXR results: pending    Endocrine  #DM  - A1C results: 5.7  - ISS none    - TSH results: 0.94      Heme:  - Thrombocytopania:  - Plt:123  - f/u daily PLT count    DVT Prophylaxis  - Lovenox 40 mgs/c daily    Code status: fullcode  Dispo: stroke unit    Discussed with Neurology Attending       73 years old male with PMH of HTN, HLD presented to ED with sudden onset of loss of vision in left eye started 2.5 hours prior to arrival to ED. s/p Tenectaplase on 01/26 with stable 24h CTH.  CTA with ICAD, L ICA stenosis 70%. MRI revealed Acute R PCA infarct with trace petechial hemorrhagic transformation, chronic R BG lacunar infarct. Most likely etiology of R PCA infarct is ischemia i/s/o ICAD vs cardioembolic. Pending KEILA and ILR prior to discharge.      # Neuro: Acute right PCA territory infarct i/s/o ICAD  - cw ASA 81 mg and Plavix 75 mg daily x 3 months  - LDL 30   - MRI reviewed  - Stroke education.    # ICAD with 70% L ICA stenosis  - Endovascular consult for potential intervention    Cards  #HTN  - permissive hypertension, Goal -180  - hold home blood pressure medication for now  - Stroke Code EKG Results:  Diagnosis Line Normal sinus rhythm  Left ventricular hypertrophy with QRS widening and repolarization abnormality  - Pending KEILA monday  - Pending ILR monday  - TTE; Summary:   1. Normal global left ventricular systolic function.   2. LV Ejection Fraction by Fan's Method with a biplane EF of 65 %.   3. Mildly enlarged right ventricle. RV systolic function is normal.   4. Severe mitral annular calcification.   5. Moderate aortic valve stenosis with peak AV velocity 3.46 m/s, RACH   1.18 cm^2, and MG 25.0 mmHg.   6. Mild aortic regurgitation.   7. Mild tricuspid regurgitation.   8. Estimated pulmonary artery systolic pressure is 45.4 mmHg assuming a   right atrial pressure of 13 mmHg,which is consistent with mild pulmonary   hypertension.      #HLD  - high dose statin as above in CVA  - LDL results: 30    Pulm  - call provider if SPO2 < 94%    GI  #Nutrition/Fluids/Electrolytes   - replete K<4 and Mg <2  - Diet::DASh/TLC  - IVF: IF BPow can start NS 0.9%    Renal  - daily BMP    Infectious Disease  - Stroke Code CXR results: pending    Endocrine  #DM  - A1C results: 5.7  - ISS none    - TSH results: 0.94      Heme:  - Thrombocytopenia:  - Plt:123  - f/u daily PLT count    DVT Prophylaxis  - Lovenox 40 mg sq daily    Code status: full code  Dispo: stroke unit    Discussed with Neurology Attending

## 2023-01-28 NOTE — CONSULT NOTE ADULT - ASSESSMENT
73 years old male with PMH of HTN, HLD presented to ED with sudden onset of loss of vision in left eye started 2.5 hours prior to arrival to ED. s/p Tenectaplase on 01/26. Patient admitted to stroke unit. His 24 hr post TNK imaging is stable    #  Acute right PCA territory infarct:  - Start ASA 81 mg Daily  - Start Plavix 75 mg daily  -  statin  - MRI reviewed  - Stroke education.  - neuro checks q4  -monitor on tele for afib  -TTE pending    #HTN  - permissive hypertension, Goal -180  - hold home blood pressure medication for now  - obtain TTE   - Stroke Code EKG Results:    #HLD  - statin as above in CVA  - LDL results: 30    COPD  nebs    H/o CAD, PVCs  ASA, PLavix, statin  pt a-Sx  check Mg 2+    #Nutrition/Fluids/Electrolytes   - replete K<4 and Mg <2  - Diet::DASh/TLC    Pre-DM  - A1C results: 5.7  - TSH results: 0.94    Mild Thrombocytopenia:  - Plt:123  - monitor    DVT Prophylaxis  - Lovenox 40 mgs/c daily, SCDs    Code status: full code  Dispo: stroke unit    #Progress Note Handoff  Pending: Clinical improvement and stability__x____TTE_______PT____x____  Pt/Family discussion: Pt informed and agrees with the current plan  Disposition: Home______/SNF_______/4A______/To be determined____x____    My note supersedes the residents note should a discrepancy arise.    Chart and notes personally reviewed.  Care Discussed with Consultants/Other Providers/ Housestaff [ x] YES [ ] NO   Radiology, labs, old records personally reviewed.    discussed w/ housestaff, nursing, case management, neuro team   73 years old male with PMH of HTN, HLD presented to ED with sudden onset of loss of vision in left eye started 2.5 hours prior to arrival to ED. s/p Tenectaplase on 01/26. Patient admitted to stroke unit. His 24 hr post TNK imaging is stable    #  Acute right PCA territory infarct:  - Start ASA 81 mg Daily  - Start Plavix 75 mg daily  -  statin  - MRI reviewed  - Stroke education.  - neuro checks q4  -monitor on tele for afib  -TTE pending    #HTN  - permissive hypertension, Goal -180  - hold home blood pressure medication for now  - obtain TTE   - Stroke Code EKG Results:    #HLD  - statin as above in CVA  - LDL results: 30    COPD  nebs    H/o CAD, PVCs, Bradycardia  ASA, PLavix, statin  pt a-Sx  check Mg 2+  monitor on tele    #Nutrition/Fluids/Electrolytes   - replete K<4 and Mg <2  - Diet::DASh/TLC    Pre-DM  - A1C results: 5.7  - TSH results: 0.94    Mild Thrombocytopenia:  - Plt:123  - monitor    DVT Prophylaxis  - Lovenox 40 mgs/c daily, SCDs    Code status: full code  Dispo: stroke unit    #Progress Note Handoff  Pending: Clinical improvement and stability__x____TTE_______PT____x____  Pt/Family discussion: Pt informed and agrees with the current plan  Disposition: Home______/SNF_______/4A______/To be determined____x____    My note supersedes the residents note should a discrepancy arise.    Chart and notes personally reviewed.  Care Discussed with Consultants/Other Providers/ Housestaff [ x] YES [ ] NO   Radiology, labs, old records personally reviewed.    discussed w/ housestaff, nursing, case management, neuro team

## 2023-01-28 NOTE — CONSULT NOTE ADULT - ASSESSMENT
Cardiology: Angel    73 years old male with PMH of HTN, HLD presented to Ed with sudden onset of loss of vision this morning. Admitted with acute CVA.    Impression:  Acute CVA  Hx HTN, HLD    Plan:  - Recommend implantable loop recorder for long term cardiac monitoring  - Discussed with pt, in agreement with plan  - Plan for Mon following KEILA  - Hold Lovenox Mon AM until after procedure  - Resend COVID  - Cont tele while admitted to monitor for arrythmias

## 2023-01-28 NOTE — PROGRESS NOTE ADULT - SUBJECTIVE AND OBJECTIVE BOX
Neurology Stroke Progress Note    INTERVAL HPI/OVERNIGHT EVENTS:  Patient seen and examined.     MEDICATIONS  (STANDING):  aspirin enteric coated 81 milliGRAM(s) Oral daily  atorvastatin 80 milliGRAM(s) Oral at bedtime  clopidogrel Tablet 75 milliGRAM(s) Oral daily  enoxaparin Injectable 40 milliGRAM(s) SubCutaneous every 24 hours    MEDICATIONS  (PRN):  albuterol    90 MICROgram(s) HFA Inhaler 2 Puff(s) Inhalation every 6 hours PRN Shortness of Breath and/or Wheezing    Allergies    No Known Allergies    Intolerances        Vital Signs Last 24 Hrs  T(C): 36.3 (28 Jan 2023 04:00), Max: 36.9 (27 Jan 2023 08:52)  T(F): 97.3 (28 Jan 2023 04:00), Max: 98.4 (27 Jan 2023 08:52)  HR: 46 (28 Jan 2023 04:00) (41 - 52)  BP: 144/67 (28 Jan 2023 04:00) (123/60 - 168/70)  BP(mean): 89 (28 Jan 2023 04:00) (89 - 113)  RR: 18 (28 Jan 2023 04:00) (18 - 20)  SpO2: 97% (28 Jan 2023 04:00) (97% - 100%)    Parameters below as of 28 Jan 2023 04:00  Patient On (Oxygen Delivery Method): room air          Physical exam:  General: No acute distress, awake and alert  Eyes: Anicteric sclerae, moist conjunctivae, see below for CNs  Neck: trachea midline, FROM, supple,   Cardiovascular: Regular rate and rhythm, no murmurs,  Pulmonary: Anterior breath sounds clear bilaterally,   GI: Abdomen soft, non-distended, non-tender      Neurologic:  Mental status: Awake, alert, oriented to person, place, and time. Speech is fluent with intact naming, repetition, and comprehension, no dysarthria. Follows commands. Attention/concentration intact.  Cranial nerve:  II: left complete hemianopia  III, IV, VI: Extraocular movements are intact without nystagmus. Pupils equally round and reactive to light  V:  Facial sensation V1-V3 equal and intact   VII: Face is symmetric with normal eye closure and smile  XII: Tongue protrudes midline  Motor: Normal bulk and tone. No pronator drift. Strength bilateral upper extremity 5/5, bilateral lower extremities 5/5.  Rapid alternating movements intact and symmetric  Sensation: Intact to light touch bilaterally. No neglect or extinction on double simultaneous testing.  Coordination: No dysmetria on B/L FTN        LABS:  cret                        12.6   6.83  )-----------( 116      ( 28 Jan 2023 05:41 )             37.5     01-27    139  |  106  |  14  ----------------------------<  97  4.2   |  28  |  0.7    Ca    9.1      27 Jan 2023 07:51    TPro  7.0  /  Alb  4.4  /  TBili  0.6  /  DBili  x   /  AST  30  /  ALT  30  /  AlkPhos  111  01-26    PT/INR - ( 26 Jan 2023 11:45 )   PT: 13.10 sec;   INR: 1.14 ratio         PTT - ( 26 Jan 2023 11:45 )  PTT:32.8 sec       Neurology Stroke Progress Note    INTERVAL HPI/OVERNIGHT EVENTS:  Patient seen and examined. No events overnight. Subjectively feels well. He feels his vision has returned. Denied headache, cp sob, palpitations.     MEDICATIONS  (STANDING):  aspirin enteric coated 81 milliGRAM(s) Oral daily  atorvastatin 80 milliGRAM(s) Oral at bedtime  clopidogrel Tablet 75 milliGRAM(s) Oral daily  enoxaparin Injectable 40 milliGRAM(s) SubCutaneous every 24 hours    MEDICATIONS  (PRN):  albuterol    90 MICROgram(s) HFA Inhaler 2 Puff(s) Inhalation every 6 hours PRN Shortness of Breath and/or Wheezing    Allergies    No Known Allergies    Intolerances        Vital Signs Last 24 Hrs  T(C): 36.3 (28 Jan 2023 04:00), Max: 36.9 (27 Jan 2023 08:52)  T(F): 97.3 (28 Jan 2023 04:00), Max: 98.4 (27 Jan 2023 08:52)  HR: 46 (28 Jan 2023 04:00) (41 - 52)  BP: 144/67 (28 Jan 2023 04:00) (123/60 - 168/70)  BP(mean): 89 (28 Jan 2023 04:00) (89 - 113)  RR: 18 (28 Jan 2023 04:00) (18 - 20)  SpO2: 97% (28 Jan 2023 04:00) (97% - 100%)    Parameters below as of 28 Jan 2023 04:00  Patient On (Oxygen Delivery Method): room air          Physical exam:  General: No acute distress, awake and alert  Eyes: Anicteric sclerae, moist conjunctivae, see below for CNs  Neck: trachea midline, FROM, supple,   Cardiovascular: Regular rate and rhythm, no murmurs,  Pulmonary: Anterior breath sounds clear bilaterally,   GI: Abdomen soft, non-distended, non-tender      Neurologic:  Mental status: Awake, alert, oriented to person, place, and time. Speech is fluent with intact naming, repetition, and comprehension, no dysarthria. Follows commands. Attention/concentration intact.  Cranial nerve:  II: left upper quadrantinopia  III, IV, VI: Extraocular movements are intact without nystagmus. Pupils equally round and reactive to light  V:  Facial sensation V1-V3 equal and intact   VII: Face is symmetric with normal eye closure and smile  XII: Tongue protrudes midline  Motor: Normal bulk and tone. No pronator drift. Strength bilateral upper extremity 5/5, bilateral lower extremities 5/5.  Rapid alternating movements intact and symmetric  Sensation: Intact to light touch bilaterally. No neglect or extinction on double simultaneous testing.  Coordination: No dysmetria on B/L FTN  NIHSS 1        LABS:  cret                        12.6   6.83  )-----------( 116      ( 28 Jan 2023 05:41 )             37.5     01-27    139  |  106  |  14  ----------------------------<  97  4.2   |  28  |  0.7    Ca    9.1      27 Jan 2023 07:51    TPro  7.0  /  Alb  4.4  /  TBili  0.6  /  DBili  x   /  AST  30  /  ALT  30  /  AlkPhos  111  01-26    PT/INR - ( 26 Jan 2023 11:45 )   PT: 13.10 sec;   INR: 1.14 ratio         PTT - ( 26 Jan 2023 11:45 )  PTT:32.8 sec    Examination is limited due to artifact potentially related to a metallic   densities in the frontal scalp.    Acute right PCA territory infarct involving the occipital lobe with trace   petechial hemorrhagic transformation.    Moderate chronic microvascular type changes as well as a chronic right   basal ganglia lacunar infarct.

## 2023-01-29 LAB
ALBUMIN SERPL ELPH-MCNC: 3.9 G/DL — SIGNIFICANT CHANGE UP (ref 3.5–5.2)
ALP SERPL-CCNC: 92 U/L — SIGNIFICANT CHANGE UP (ref 30–115)
ALT FLD-CCNC: 15 U/L — SIGNIFICANT CHANGE UP (ref 0–41)
ANION GAP SERPL CALC-SCNC: 10 MMOL/L — SIGNIFICANT CHANGE UP (ref 7–14)
APTT BLD: 33.9 SEC — SIGNIFICANT CHANGE UP (ref 27–39.2)
AST SERPL-CCNC: 15 U/L — SIGNIFICANT CHANGE UP (ref 0–41)
BASOPHILS # BLD AUTO: 0.03 K/UL — SIGNIFICANT CHANGE UP (ref 0–0.2)
BASOPHILS NFR BLD AUTO: 0.4 % — SIGNIFICANT CHANGE UP (ref 0–1)
BILIRUB SERPL-MCNC: 1 MG/DL — SIGNIFICANT CHANGE UP (ref 0.2–1.2)
BUN SERPL-MCNC: 20 MG/DL — SIGNIFICANT CHANGE UP (ref 10–20)
CALCIUM SERPL-MCNC: 9.1 MG/DL — SIGNIFICANT CHANGE UP (ref 8.4–10.5)
CHLORIDE SERPL-SCNC: 102 MMOL/L — SIGNIFICANT CHANGE UP (ref 98–110)
CO2 SERPL-SCNC: 24 MMOL/L — SIGNIFICANT CHANGE UP (ref 17–32)
CREAT SERPL-MCNC: 0.7 MG/DL — SIGNIFICANT CHANGE UP (ref 0.7–1.5)
EGFR: 97 ML/MIN/1.73M2 — SIGNIFICANT CHANGE UP
EOSINOPHIL # BLD AUTO: 0.22 K/UL — SIGNIFICANT CHANGE UP (ref 0–0.7)
EOSINOPHIL NFR BLD AUTO: 3.3 % — SIGNIFICANT CHANGE UP (ref 0–8)
GLUCOSE SERPL-MCNC: 87 MG/DL — SIGNIFICANT CHANGE UP (ref 70–99)
HCT VFR BLD CALC: 38.1 % — LOW (ref 42–52)
HGB BLD-MCNC: 12.7 G/DL — LOW (ref 14–18)
IMM GRANULOCYTES NFR BLD AUTO: 0.3 % — SIGNIFICANT CHANGE UP (ref 0.1–0.3)
INR BLD: 1.24 RATIO — SIGNIFICANT CHANGE UP (ref 0.65–1.3)
LYMPHOCYTES # BLD AUTO: 1.38 K/UL — SIGNIFICANT CHANGE UP (ref 1.2–3.4)
LYMPHOCYTES # BLD AUTO: 20.7 % — SIGNIFICANT CHANGE UP (ref 20.5–51.1)
MAGNESIUM SERPL-MCNC: 2.1 MG/DL — SIGNIFICANT CHANGE UP (ref 1.8–2.4)
MCHC RBC-ENTMCNC: 32.4 PG — HIGH (ref 27–31)
MCHC RBC-ENTMCNC: 33.3 G/DL — SIGNIFICANT CHANGE UP (ref 32–37)
MCV RBC AUTO: 97.2 FL — HIGH (ref 80–94)
MONOCYTES # BLD AUTO: 0.88 K/UL — HIGH (ref 0.1–0.6)
MONOCYTES NFR BLD AUTO: 13.2 % — HIGH (ref 1.7–9.3)
NEUTROPHILS # BLD AUTO: 4.15 K/UL — SIGNIFICANT CHANGE UP (ref 1.4–6.5)
NEUTROPHILS NFR BLD AUTO: 62.1 % — SIGNIFICANT CHANGE UP (ref 42.2–75.2)
NRBC # BLD: 0 /100 WBCS — SIGNIFICANT CHANGE UP (ref 0–0)
PHOSPHATE SERPL-MCNC: 3.4 MG/DL — SIGNIFICANT CHANGE UP (ref 2.1–4.9)
PLATELET # BLD AUTO: 111 K/UL — LOW (ref 130–400)
POTASSIUM SERPL-MCNC: 4.2 MMOL/L — SIGNIFICANT CHANGE UP (ref 3.5–5)
POTASSIUM SERPL-SCNC: 4.2 MMOL/L — SIGNIFICANT CHANGE UP (ref 3.5–5)
PROT SERPL-MCNC: 6.3 G/DL — SIGNIFICANT CHANGE UP (ref 6–8)
PROTHROM AB SERPL-ACNC: 14.2 SEC — HIGH (ref 9.95–12.87)
RBC # BLD: 3.92 M/UL — LOW (ref 4.7–6.1)
RBC # FLD: 13 % — SIGNIFICANT CHANGE UP (ref 11.5–14.5)
SODIUM SERPL-SCNC: 136 MMOL/L — SIGNIFICANT CHANGE UP (ref 135–146)
WBC # BLD: 6.68 K/UL — SIGNIFICANT CHANGE UP (ref 4.8–10.8)
WBC # FLD AUTO: 6.68 K/UL — SIGNIFICANT CHANGE UP (ref 4.8–10.8)

## 2023-01-29 PROCEDURE — 99233 SBSQ HOSP IP/OBS HIGH 50: CPT

## 2023-01-29 PROCEDURE — 99233 SBSQ HOSP IP/OBS HIGH 50: CPT | Mod: GC

## 2023-01-29 RX ADMIN — Medication 81 MILLIGRAM(S): at 11:25

## 2023-01-29 RX ADMIN — ENOXAPARIN SODIUM 40 MILLIGRAM(S): 100 INJECTION SUBCUTANEOUS at 13:07

## 2023-01-29 RX ADMIN — MAGNESIUM OXIDE 400 MG ORAL TABLET 400 MILLIGRAM(S): 241.3 TABLET ORAL at 07:30

## 2023-01-29 RX ADMIN — CLOPIDOGREL BISULFATE 75 MILLIGRAM(S): 75 TABLET, FILM COATED ORAL at 11:25

## 2023-01-29 RX ADMIN — MAGNESIUM OXIDE 400 MG ORAL TABLET 400 MILLIGRAM(S): 241.3 TABLET ORAL at 12:10

## 2023-01-29 RX ADMIN — MAGNESIUM OXIDE 400 MG ORAL TABLET 400 MILLIGRAM(S): 241.3 TABLET ORAL at 16:49

## 2023-01-29 RX ADMIN — POLYETHYLENE GLYCOL 3350 17 GRAM(S): 17 POWDER, FOR SOLUTION ORAL at 07:30

## 2023-01-29 RX ADMIN — SENNA PLUS 2 TABLET(S): 8.6 TABLET ORAL at 11:25

## 2023-01-29 RX ADMIN — ATORVASTATIN CALCIUM 80 MILLIGRAM(S): 80 TABLET, FILM COATED ORAL at 21:08

## 2023-01-29 NOTE — PROGRESS NOTE ADULT - SUBJECTIVE AND OBJECTIVE BOX
EDGAR HENAO  73y  Male    Patient is a 73y old  Male who presents with a chief complaint of sudden loss of vision (28 Jan 2023 06:55)      HPI:  73 years old male with PMH of CAD, HTN, HLD presented to Ed with sudden onset of loss of vision this morning. he was doing well until 9:00 am when he suddenly noticed he developed blurry vision in both eyes . He also noticed that he was feeling dizzy at the time. No H/O fall or recent head trauma. Denies any slurred speech, trouble speaking, weakness. He never had similar symptoms in the past. N H/O stroke in the past.  (26 Jan 2023 15:57)    S: Patient was examined and seen at bedside. This morning pt is resting comfortably in bed and reports no new issues or overnight events. No complaints, feels better but still can't see well on the left. Reports wt loss over the last few years.  Denies CP, SOB, N/V/D/C/AP, cough, F, chills, dizziness, new focal weakness, HA, dysuria, or urinary symptoms, blood in stool.  ROS: all other systems reviewed and are negative    PAST MEDICAL & SURGICAL HISTORY:  Non-ST elevation (NSTEMI) myocardial infarction      Kidney calculi      Abdominal aortic aneurysm (AAA) without rupture      HTN (hypertension)      HLD (hyperlipidemia)      Aortic stenosis      2019 novel coronavirus disease (COVID-19)  5/2022      No significant past surgical history        SOCIAL HISTORY:  Tobacco: former smoker (quit 20yrs ago)  Illicit drugs: negative  Alcohol: social  Family history reviewed and otherwise non-contributory No clotting disorders, CVAs at early age.  ALLERGIES: NKDA    MEDICATIONS  (STANDING):  aspirin enteric coated 81 milliGRAM(s) Oral daily  atorvastatin 80 milliGRAM(s) Oral at bedtime  clopidogrel Tablet 75 milliGRAM(s) Oral daily  enoxaparin Injectable 40 milliGRAM(s) SubCutaneous every 24 hours  polyethylene glycol 3350 17 Gram(s) Oral daily  senna 2 Tablet(s) Oral at bedtime    MEDICATIONS  (PRN):  albuterol    90 MICROgram(s) HFA Inhaler 2 Puff(s) Inhalation every 6 hours PRN Shortness of Breath and/or Wheezing    Home Medications:  Albuterol (Eqv-ProAir HFA) 90 mcg/inh inhalation aerosol: 2 puff(s) inhaled every 6 hours, As Needed (21 Jul 2022 11:42)  amLODIPine 10 mg oral tablet: 1 tab(s) orally once a day (26 Jan 2023 16:04)  atorvastatin 40 mg oral tablet: 1 tab(s) orally once a day (21 Jul 2022 11:42)          Vital Signs Last 24 Hrs  T(C): 36.5 (28 Jan 2023 12:00), Max: 36.6 (27 Jan 2023 16:10)  T(F): 97.7 (28 Jan 2023 12:00), Max: 97.9 (27 Jan 2023 16:10)  HR: 48 (28 Jan 2023 12:00) (43 - 64)  BP: 127/64 (28 Jan 2023 12:00) (123/60 - 168/70)  BP(mean): 94 (28 Jan 2023 12:00) (88 - 113)  RR: 18 (28 Jan 2023 12:00) (18 - 19)  SpO2: 99% (28 Jan 2023 12:00) (97% - 99%)    Parameters below as of 28 Jan 2023 12:00  Patient On (Oxygen Delivery Method): room air      CAPILLARY BLOOD GLUCOSE          General: NAD. Chronically ill appearing. Thin  HEENT: clean oropharynx, EOMI, no LAD, Lt HH  Neck: trachea midline, no thyromegaly  CV: nl S1 S2; no m/r/g  Resp: decreased breath sounds at base  GI: NT/ND/S +BS  MS: no clubbing/cyanosis/edema, +pulses  Neuro: motor, sensory intact; + reflexes  Skin: no rashes, nl turgor  Psychiatric: AA0x3 w/ fair insight and judgement    tele: SR w/ occasional PVCs, nonspecific changes (on my own evaluation of tele monitor)          Home Medications:  Albuterol (Eqv-ProAir HFA) 90 mcg/inh inhalation aerosol: 2 puff(s) inhaled every 6 hours, As Needed (21 Jul 2022 11:42)  amLODIPine 10 mg oral tablet: 1 tab(s) orally once a day (26 Jan 2023 16:04)  atorvastatin 40 mg oral tablet: 1 tab(s) orally once a day (21 Jul 2022 11:42)    MEDICATIONS  (STANDING):  aspirin enteric coated 81 milliGRAM(s) Oral daily  atorvastatin 80 milliGRAM(s) Oral at bedtime  clopidogrel Tablet 75 milliGRAM(s) Oral daily  enoxaparin Injectable 40 milliGRAM(s) SubCutaneous every 24 hours  magnesium oxide 400 milliGRAM(s) Oral three times a day with meals  polyethylene glycol 3350 17 Gram(s) Oral daily  senna 2 Tablet(s) Oral at bedtime    MEDICATIONS  (PRN):  albuterol    90 MICROgram(s) HFA Inhaler 2 Puff(s) Inhalation every 6 hours PRN Shortness of Breath and/or Wheezing    Vital Signs Last 24 Hrs  T(C): 35.7 (29 Jan 2023 12:00), Max: 36.9 (28 Jan 2023 20:00)  T(F): 96.2 (29 Jan 2023 12:00), Max: 98.5 (28 Jan 2023 20:00)  HR: 54 (29 Jan 2023 12:00) (48 - 61)  BP: 148/72 (29 Jan 2023 12:00) (131/56 - 165/64)  BP(mean): 89 (29 Jan 2023 12:00) (82 - 99)  RR: 18 (29 Jan 2023 12:00) (18 - 18)  SpO2: 97% (29 Jan 2023 12:00) (97% - 97%)    Parameters below as of 29 Jan 2023 12:00  Patient On (Oxygen Delivery Method): room air      CAPILLARY BLOOD GLUCOSE        LABS:                        12.7   6.68  )-----------( 111      ( 29 Jan 2023 06:27 )             38.1     01-29    136  |  102  |  20  ----------------------------<  87  4.2   |  24  |  0.7    Ca    9.1      29 Jan 2023 06:27  Phos  3.4     01-29  Mg     2.1     01-29    TPro  6.3  /  Alb  3.9  /  TBili  1.0  /  DBili  x   /  AST  15  /  ALT  15  /  AlkPhos  92  01-29    LIVER FUNCTIONS - ( 29 Jan 2023 06:27 )  Alb: 3.9 g/dL / Pro: 6.3 g/dL / ALK PHOS: 92 U/L / ALT: 15 U/L / AST: 15 U/L / GGT: x               PT/INR - ( 29 Jan 2023 06:27 )   PT: 14.20 sec;   INR: 1.24 ratio         PTT - ( 29 Jan 2023 06:27 )  PTT:33.9 sec            Consultant Notes Reviewed:  [x ] YES  [ ] NO  Care Discussed with Consultants/Other Providers/ Housestaff [ x] YES  [ ] NO  Radiology, labs, new studies personally reviewed.                                                       EDGAR HENAO  73y  Male    Patient is a 73y old  Male who presents with a chief complaint of sudden loss of vision (28 Jan 2023 06:55)      HPI:  73 years old male with PMH of CAD, HTN, HLD presented to Ed with sudden onset of loss of vision this morning. he was doing well until 9:00 am when he suddenly noticed he developed blurry vision in both eyes . He also noticed that he was feeling dizzy at the time. No H/O fall or recent head trauma. Denies any slurred speech, trouble speaking, weakness. He never had similar symptoms in the past. N H/O stroke in the past.  (26 Jan 2023 15:57)    S: Patient was examined and seen at bedside. This morning pt is resting comfortably in bed and reports no new issues or overnight events. No complaints, feels better but still can't see well on the left. Reports wt loss over the last few years. Eager to go home.  Denies CP, SOB, N/V/D/C/AP, cough, F, chills, dizziness, new focal weakness, HA, dysuria, or urinary symptoms, blood in stool.  ROS: all other systems reviewed and are negative    PAST MEDICAL & SURGICAL HISTORY:  Non-ST elevation (NSTEMI) myocardial infarction      Kidney calculi      Abdominal aortic aneurysm (AAA) without rupture      HTN (hypertension)      HLD (hyperlipidemia)      Aortic stenosis      2019 novel coronavirus disease (COVID-19)  5/2022      No significant past surgical history        SOCIAL HISTORY:  Tobacco: former smoker (quit 20yrs ago)  Illicit drugs: negative  Alcohol: social  Family history reviewed and otherwise non-contributory No clotting disorders, CVAs at early age.  ALLERGIES: NKDA        General: NAD. Chronically ill appearing. Thin  HEENT: clean oropharynx, EOMI, no LAD, Lt HH  Neck: trachea midline, no thyromegaly  CV: nl S1 S2; no m/r/g  Resp: decreased breath sounds at base  GI: NT/ND/S +BS  MS: no clubbing/cyanosis/edema, +pulses  Neuro: motor, sensory intact; + reflexes  Skin: no rashes, nl turgor  Psychiatric: AA0x3 w/ fair insight and judgement    tele: SR w/ occasional PVCs, nonspecific changes (on my own evaluation of tele monitor)          Home Medications:  Albuterol (Eqv-ProAir HFA) 90 mcg/inh inhalation aerosol: 2 puff(s) inhaled every 6 hours, As Needed (21 Jul 2022 11:42)  amLODIPine 10 mg oral tablet: 1 tab(s) orally once a day (26 Jan 2023 16:04)  atorvastatin 40 mg oral tablet: 1 tab(s) orally once a day (21 Jul 2022 11:42)    MEDICATIONS  (STANDING):  aspirin enteric coated 81 milliGRAM(s) Oral daily  atorvastatin 80 milliGRAM(s) Oral at bedtime  clopidogrel Tablet 75 milliGRAM(s) Oral daily  enoxaparin Injectable 40 milliGRAM(s) SubCutaneous every 24 hours  magnesium oxide 400 milliGRAM(s) Oral three times a day with meals  polyethylene glycol 3350 17 Gram(s) Oral daily  senna 2 Tablet(s) Oral at bedtime    MEDICATIONS  (PRN):  albuterol    90 MICROgram(s) HFA Inhaler 2 Puff(s) Inhalation every 6 hours PRN Shortness of Breath and/or Wheezing    Vital Signs Last 24 Hrs  T(C): 35.7 (29 Jan 2023 12:00), Max: 36.9 (28 Jan 2023 20:00)  T(F): 96.2 (29 Jan 2023 12:00), Max: 98.5 (28 Jan 2023 20:00)  HR: 54 (29 Jan 2023 12:00) (48 - 61)  BP: 148/72 (29 Jan 2023 12:00) (131/56 - 165/64)  BP(mean): 89 (29 Jan 2023 12:00) (82 - 99)  RR: 18 (29 Jan 2023 12:00) (18 - 18)  SpO2: 97% (29 Jan 2023 12:00) (97% - 97%)    Parameters below as of 29 Jan 2023 12:00  Patient On (Oxygen Delivery Method): room air      CAPILLARY BLOOD GLUCOSE        LABS:                        12.7   6.68  )-----------( 111      ( 29 Jan 2023 06:27 )             38.1     01-29    136  |  102  |  20  ----------------------------<  87  4.2   |  24  |  0.7    Ca    9.1      29 Jan 2023 06:27  Phos  3.4     01-29  Mg     2.1     01-29    TPro  6.3  /  Alb  3.9  /  TBili  1.0  /  DBili  x   /  AST  15  /  ALT  15  /  AlkPhos  92  01-29    LIVER FUNCTIONS - ( 29 Jan 2023 06:27 )  Alb: 3.9 g/dL / Pro: 6.3 g/dL / ALK PHOS: 92 U/L / ALT: 15 U/L / AST: 15 U/L / GGT: x               PT/INR - ( 29 Jan 2023 06:27 )   PT: 14.20 sec;   INR: 1.24 ratio         PTT - ( 29 Jan 2023 06:27 )  PTT:33.9 sec            Consultant Notes Reviewed:  [x ] YES  [ ] NO  Care Discussed with Consultants/Other Providers/ Housestaff [ x] YES  [ ] NO  Radiology, labs, new studies personally reviewed.

## 2023-01-29 NOTE — PROGRESS NOTE ADULT - ASSESSMENT
73 years old male with PMH of HTN, HLD presented to ED with sudden onset of loss of vision in left eye started 2.5 hours prior to arrival to ED. s/p Tenectaplase on 01/26 with stable 24h CTH.  CTA with ICAD, L ICA stenosis 70%. MRI revealed Acute R PCA infarct with trace petechial hemorrhagic transformation, chronic R BG lacunar infarct. Most likely etiology of R PCA infarct is ischemia i/s/o ICAD vs cardioembolic. Pending KEILA and ILR prior to discharge.  INCOMPLETE NOTE    # Neuro: Acute right PCA territory infarct i/s/o ICAD  - cw ASA 81 mg and Plavix 75 mg daily x 3 months  - LDL 30   - MRI reviewed  - Stroke education.    # ICAD with 70% L ICA stenosis  - Endovascular consult for potential intervention    Cards  #HTN  - permissive hypertension, Goal -180  - hold home blood pressure medication for now  - Stroke Code EKG Results:  Diagnosis Line Normal sinus rhythm  Left ventricular hypertrophy with QRS widening and repolarization abnormality  - Pending KEILA monday  - Pending ILR monday  - TTE; Summary:   1. Normal global left ventricular systolic function.   2. LV Ejection Fraction by Fan's Method with a biplane EF of 65 %.   3. Mildly enlarged right ventricle. RV systolic function is normal.   4. Severe mitral annular calcification.   5. Moderate aortic valve stenosis with peak AV velocity 3.46 m/s, RACH   1.18 cm^2, and MG 25.0 mmHg.   6. Mild aortic regurgitation.   7. Mild tricuspid regurgitation.   8. Estimated pulmonary artery systolic pressure is 45.4 mmHg assuming a   right atrial pressure of 13 mmHg,which is consistent with mild pulmonary   hypertension.      #HLD  - high dose statin as above in CVA  - LDL results: 30    Pulm  - call provider if SPO2 < 94%    GI  #Nutrition/Fluids/Electrolytes   - replete K<4 and Mg <2  - Diet::DASh/TLC  - IVF: IF BPow can start NS 0.9%    Renal  - daily BMP    Infectious Disease  - Stroke Code CXR results: pending    Endocrine  #DM  - A1C results: 5.7  - ISS none    - TSH results: 0.94      Heme:  - Thrombocytopenia:  - Plt:123  - f/u daily PLT count    DVT Prophylaxis  - Lovenox 40 mg sq daily    Code status: full code  Dispo: stroke unit    Discussed with Neurology Attending

## 2023-01-29 NOTE — PROGRESS NOTE ADULT - ASSESSMENT
73 years old male with PMH of HTN, HLD presented to ED with sudden onset of loss of vision in left eye started 2.5 hours prior to arrival to ED. s/p Tenectaplase on 01/26. Patient admitted to stroke unit. His 24 hr post TNK imaging is stable    #  Acute right PCA territory infarct: / Distal R PCA severe stenosis  - Start ASA 81 mg Daily  - Start Plavix 75 mg daily  -  statin  - MRI reviewed  - Stroke education.  - neuro checks q4  -monitor on tele for afib  -TTE  1. Normal global left ventricular systolic function.   2. LV Ejection Fraction by Fan's Method with a biplane EF of 65 %.   3. Mildly enlarged right ventricle. RV systolic function is normal.   4. Severe mitral annular calcification.   5. Moderate aortic valve stenosis with peak AV velocity 3.46 m/s, RACH 1.18 cm^2, and MG 25.0 mmHg.   6. Mild aortic regurgitation.   7. Mild tricuspid regurgitation.   8. Estimated pulmonary artery systolic pressure is 45.4 mmHg assuming a   right atrial pressure of 13 mmHg,which is consistent with mild pulmonary hypertension.  < from: CT Angio Neck Stroke Protocol w/ IV Cont (01.26.23 @ 12:42) >  CTA neck-  1.  Right carotid: Atheromatous plaque at the carotid bifurcation with   mild (<50%) stenosis of the distal CCA, severe (70-80%) stenosis of the   ICA originand moderate stenosis of the ECA origin.  2.  Left carotid: Calcific plaque at the left carotid bifurcation with   severe (70-80%) stenosis of the proximal ICA and moderate stenosis of the   proximal ECA.  3.  Vertebral arteries: Calcific plaque at the left vertebral artery   origin with likely moderate-severe stenosis. Scattered mild-moderate   stenoses bilaterally.    CTA head-  1.  Calcific plaque of the carotid siphons with moderate stenoses bilaterally. The anterior and middle cerebral arteries are patent.  2.  Moderate stenoses of the left MCA M1 segment.  3.  Moderate-severe calcific stenosis of the V4 segment of the left vertebral artery.  4.  Severe stenosis or occlusion of the distal right PCA.    Perfusion- Small perfusion abnormality (penumbra) in the right occipital lobe.    Other-  1.  Appearance of the right vocal cord suggesting right vocal cord paralysis.  Correlate clinically.  2.  Polypoid opacities within the nasal cavity.    < end of copied text >  -awaiting KEILA, ILR    #HTN  - permissive hypertension, Goal -180  - hold home blood pressure medication for now w/ gradual BP lowering    #HLD  - statin as above in CVA (consider lowering the dose)  - LDL results: 30    COPD  nebs    H/o CAD, PVCs, Bradycardia  ASA, PLavix, statin  pt a-Sx  check Mg 2+ (keep K>4, Mg>2)  monitor on tele  can give low dose BB if BP, HR tolerate    #Nutrition/Fluids/Electrolytes   - replete K<4 and Mg <2  - Diet::DASh/TLC    Pre-DM  - A1C results: 5.7  - TSH results: 0.94    Mild Thrombocytopenia:  - Plt:123  - monitor    Weight loss  recommend outpt     DVT Prophylaxis  - Lovenox 40 mgs/c daily, SCDs    Code status: full code  Dispo: stroke unit    #Progress Note Handoff  Pending: Clinical improvement and stability__x____TTE_______PT____x____  Pt/Family discussion: Pt informed and agrees with the current plan  Disposition: Home______/SNF_______/4A______/To be determined____x____    My note supersedes the residents note should a discrepancy arise.    Chart and notes personally reviewed.  Care Discussed with Consultants/Other Providers/ Housestaff [ x] YES [ ] NO   Radiology, labs, old records personally reviewed.    discussed w/ housestaff, nursing, case management, neuro team   73 years old male with PMH of HTN, HLD presented to ED with sudden onset of loss of vision in left eye started 2.5 hours prior to arrival to ED. s/p Tenectaplase on 01/26. Patient admitted to stroke unit. His 24 hr post TNK imaging is stable    #  Acute right PCA territory infarct: / Distal R PCA severe stenosis  - Start ASA 81 mg Daily  - Start Plavix 75 mg daily  -  statin  - MRI reviewed  - Stroke education.  - neuro checks q4  -monitor on tele for afib  -TTE  1. Normal global left ventricular systolic function.   2. LV Ejection Fraction by Fan's Method with a biplane EF of 65 %.   3. Mildly enlarged right ventricle. RV systolic function is normal.   4. Severe mitral annular calcification.   5. Moderate aortic valve stenosis with peak AV velocity 3.46 m/s, RACH 1.18 cm^2, and MG 25.0 mmHg.   6. Mild aortic regurgitation.   7. Mild tricuspid regurgitation.   8. Estimated pulmonary artery systolic pressure is 45.4 mmHg assuming a   right atrial pressure of 13 mmHg,which is consistent with mild pulmonary hypertension.  < from: CT Angio Neck Stroke Protocol w/ IV Cont (01.26.23 @ 12:42) >  CTA neck-  1.  Right carotid: Atheromatous plaque at the carotid bifurcation with   mild (<50%) stenosis of the distal CCA, severe (70-80%) stenosis of the   ICA originand moderate stenosis of the ECA origin.  2.  Left carotid: Calcific plaque at the left carotid bifurcation with   severe (70-80%) stenosis of the proximal ICA and moderate stenosis of the   proximal ECA.  3.  Vertebral arteries: Calcific plaque at the left vertebral artery   origin with likely moderate-severe stenosis. Scattered mild-moderate   stenoses bilaterally.    CTA head-  1.  Calcific plaque of the carotid siphons with moderate stenoses bilaterally. The anterior and middle cerebral arteries are patent.  2.  Moderate stenoses of the left MCA M1 segment.  3.  Moderate-severe calcific stenosis of the V4 segment of the left vertebral artery.  4.  Severe stenosis or occlusion of the distal right PCA.    Perfusion- Small perfusion abnormality (penumbra) in the right occipital lobe.    Other-  1.  Appearance of the right vocal cord suggesting right vocal cord paralysis.  Correlate clinically.  2.  Polypoid opacities within the nasal cavity.    < end of copied text >  -awaiting KEILA, ILR    #HTN  - permissive hypertension, Goal -180  - hold home blood pressure medication for now w/ gradual BP lowering    #HLD  - statin as above in CVA (consider lowering the dose)  - LDL results: 30    COPD  nebs    H/o CAD, PVCs, Bradycardia  ASA, PLavix, statin  pt a-Sx  check Mg 2+ (keep K>4, Mg>2)  monitor on tele  can give low dose BB if BP, HR tolerate  outpt cardio f/u    #Nutrition/Fluids/Electrolytes   - replete K<4 and Mg <2  - Diet::DASh/TLC    Pre-DM  - A1C results: 5.7  - TSH results: 0.94    Mild Thrombocytopenia:  - Plt:123  - monitor    Weight loss  recommend outpt f/u w/ PMD to ensure pt is uptodate on primary malignancy screening, etc    DVT Prophylaxis  - Lovenox 40 mgs/c daily, SCDs    Code status: full code  Dispo: stroke unit    #Progress Note Handoff  Pending: Clinical improvement and stability__x____TTE, ILR_______PT____x____  Pt/Family discussion: Pt informed and agrees with the current plan  Disposition: Home______/SNF_______/4A______/To be determined____x____    My note supersedes the residents note should a discrepancy arise.    Chart and notes personally reviewed.  Care Discussed with Consultants/Other Providers/ Housestaff [ x] YES [ ] NO   Radiology, labs, old records personally reviewed.    discussed w/ housestaff, nursing, case management, neuro team

## 2023-01-29 NOTE — PROGRESS NOTE ADULT - SUBJECTIVE AND OBJECTIVE BOX
Neurology Progress Note    Interval History:  The patient was seen and examined at the bedside. INCOMPLETE      PAST MEDICAL & SURGICAL HISTORY:  Non-ST elevation (NSTEMI) myocardial infarction    Kidney calculi    Abdominal aortic aneurysm (AAA) without rupture    HTN (hypertension)    HLD (hyperlipidemia)    Aortic stenosis    2019 novel coronavirus disease (COVID-19)  5/2022    No significant past surgical history            Medications:  albuterol    90 MICROgram(s) HFA Inhaler 2 Puff(s) Inhalation every 6 hours PRN  aspirin enteric coated 81 milliGRAM(s) Oral daily  atorvastatin 80 milliGRAM(s) Oral at bedtime  clopidogrel Tablet 75 milliGRAM(s) Oral daily  enoxaparin Injectable 40 milliGRAM(s) SubCutaneous every 24 hours  magnesium oxide 400 milliGRAM(s) Oral three times a day with meals  polyethylene glycol 3350 17 Gram(s) Oral daily  senna 2 Tablet(s) Oral at bedtime      Vital Signs Last 24 Hrs  T(C): 36.9 (28 Jan 2023 20:00), Max: 36.9 (28 Jan 2023 20:00)  T(F): 98.5 (28 Jan 2023 20:00), Max: 98.5 (28 Jan 2023 20:00)  HR: 61 (29 Jan 2023 04:00) (48 - 64)  BP: 137/64 (29 Jan 2023 04:00) (127/64 - 165/64)  BP(mean): 99 (28 Jan 2023 20:00) (82 - 99)  RR: 18 (29 Jan 2023 04:00) (18 - 18)  SpO2: 97% (29 Jan 2023 04:00) (97% - 99%)    Parameters below as of 29 Jan 2023 04:00  Patient On (Oxygen Delivery Method): room air        Neurological Exam:   Mental status: Awake, alert and oriented x3.  Recent and remote memory intact.  Naming, repetition and comprehension intact.  Attention/concentration intact.  No dysarthria, no aphasia.  Fund of knowledge appropriate.    Cranial nerves: Pupils equally round and reactive to light, visual fields full, no nystagmus, extraocular muscles intact, V1 through V3 intact bilaterally and symmetric, face symmetric, hearing intact to finger rub, palate elevation symmetric, tongue was midline.  Motor:  MRC grading 5/5 b/l UE/LE.   strength 5/5.  Normal tone and bulk.  No abnormal movements.    Sensation: Intact to light touch, proprioception, and pinprick.   Coordination: No dysmetria on finger-to-nose and heel-to-shin.  No dysdiadokinesia.  Reflexes: 2+ in bilateral UE/LE, downgoing toes bilaterally. (-) Hall.  Gait: Narrow and steady. No ataxia.  Romberg negative    Labs:  CBC Full  -  ( 28 Jan 2023 05:41 )  WBC Count : 6.83 K/uL  RBC Count : 3.90 M/uL  Hemoglobin : 12.6 g/dL  Hematocrit : 37.5 %  Platelet Count - Automated : 116 K/uL  Mean Cell Volume : 96.2 fL  Mean Cell Hemoglobin : 32.3 pg  Mean Cell Hemoglobin Concentration : 33.6 g/dL  Auto Neutrophil # : x  Auto Lymphocyte # : x  Auto Monocyte # : x  Auto Eosinophil # : x  Auto Basophil # : x  Auto Neutrophil % : x  Auto Lymphocyte % : x  Auto Monocyte % : x  Auto Eosinophil % : x  Auto Basophil % : x    01-28    137  |  104  |  17  ----------------------------<  98  4.2   |  26  |  0.6<L>    Ca    8.9      28 Jan 2023 05:41

## 2023-01-30 ENCOUNTER — TRANSCRIPTION ENCOUNTER (OUTPATIENT)
Age: 73
End: 2023-01-30

## 2023-01-30 VITALS
SYSTOLIC BLOOD PRESSURE: 115 MMHG | DIASTOLIC BLOOD PRESSURE: 57 MMHG | RESPIRATION RATE: 18 BRPM | HEART RATE: 58 BPM | TEMPERATURE: 97 F

## 2023-01-30 LAB
ALBUMIN SERPL ELPH-MCNC: 3.9 G/DL — SIGNIFICANT CHANGE UP (ref 3.5–5.2)
ALP SERPL-CCNC: 88 U/L — SIGNIFICANT CHANGE UP (ref 30–115)
ALT FLD-CCNC: 14 U/L — SIGNIFICANT CHANGE UP (ref 0–41)
ANION GAP SERPL CALC-SCNC: 8 MMOL/L — SIGNIFICANT CHANGE UP (ref 7–14)
AST SERPL-CCNC: 15 U/L — SIGNIFICANT CHANGE UP (ref 0–41)
BILIRUB SERPL-MCNC: 1.1 MG/DL — SIGNIFICANT CHANGE UP (ref 0.2–1.2)
BUN SERPL-MCNC: 20 MG/DL — SIGNIFICANT CHANGE UP (ref 10–20)
CALCIUM SERPL-MCNC: 9.1 MG/DL — SIGNIFICANT CHANGE UP (ref 8.4–10.5)
CHLORIDE SERPL-SCNC: 104 MMOL/L — SIGNIFICANT CHANGE UP (ref 98–110)
CO2 SERPL-SCNC: 25 MMOL/L — SIGNIFICANT CHANGE UP (ref 17–32)
CREAT SERPL-MCNC: 0.6 MG/DL — LOW (ref 0.7–1.5)
EGFR: 101 ML/MIN/1.73M2 — SIGNIFICANT CHANGE UP
FOLATE SERPL-MCNC: 16.1 NG/ML — SIGNIFICANT CHANGE UP
FOLATE SERPL-MCNC: 16.6 NG/ML — SIGNIFICANT CHANGE UP
GLUCOSE SERPL-MCNC: 89 MG/DL — SIGNIFICANT CHANGE UP (ref 70–99)
HCT VFR BLD CALC: 36 % — LOW (ref 42–52)
HGB BLD-MCNC: 12.5 G/DL — LOW (ref 14–18)
MAGNESIUM SERPL-MCNC: 2.1 MG/DL — SIGNIFICANT CHANGE UP (ref 1.8–2.4)
MCHC RBC-ENTMCNC: 32.8 PG — HIGH (ref 27–31)
MCHC RBC-ENTMCNC: 34.7 G/DL — SIGNIFICANT CHANGE UP (ref 32–37)
MCV RBC AUTO: 94.5 FL — HIGH (ref 80–94)
NRBC # BLD: 0 /100 WBCS — SIGNIFICANT CHANGE UP (ref 0–0)
PHOSPHATE SERPL-MCNC: 3.5 MG/DL — SIGNIFICANT CHANGE UP (ref 2.1–4.9)
PLATELET # BLD AUTO: 117 K/UL — LOW (ref 130–400)
POTASSIUM SERPL-MCNC: 4.2 MMOL/L — SIGNIFICANT CHANGE UP (ref 3.5–5)
POTASSIUM SERPL-SCNC: 4.2 MMOL/L — SIGNIFICANT CHANGE UP (ref 3.5–5)
PROT SERPL-MCNC: 6.2 G/DL — SIGNIFICANT CHANGE UP (ref 6–8)
RBC # BLD: 3.81 M/UL — LOW (ref 4.7–6.1)
RBC # FLD: 12.9 % — SIGNIFICANT CHANGE UP (ref 11.5–14.5)
SODIUM SERPL-SCNC: 137 MMOL/L — SIGNIFICANT CHANGE UP (ref 135–146)
T3 SERPL-MCNC: 87 NG/DL — SIGNIFICANT CHANGE UP (ref 80–200)
T4 FREE SERPL-MCNC: 1 NG/DL — SIGNIFICANT CHANGE UP (ref 0.9–1.8)
VIT B12 SERPL-MCNC: 311 PG/ML — SIGNIFICANT CHANGE UP (ref 232–1245)
VIT B12 SERPL-MCNC: 319 PG/ML — SIGNIFICANT CHANGE UP (ref 232–1245)
WBC # BLD: 6.36 K/UL — SIGNIFICANT CHANGE UP (ref 4.8–10.8)
WBC # FLD AUTO: 6.36 K/UL — SIGNIFICANT CHANGE UP (ref 4.8–10.8)

## 2023-01-30 PROCEDURE — 99232 SBSQ HOSP IP/OBS MODERATE 35: CPT

## 2023-01-30 PROCEDURE — 93320 DOPPLER ECHO COMPLETE: CPT | Mod: 26

## 2023-01-30 PROCEDURE — 95816 EEG AWAKE AND DROWSY: CPT | Mod: 26

## 2023-01-30 PROCEDURE — 93970 EXTREMITY STUDY: CPT | Mod: 26

## 2023-01-30 PROCEDURE — 93312 ECHO TRANSESOPHAGEAL: CPT | Mod: 26

## 2023-01-30 PROCEDURE — 99239 HOSP IP/OBS DSCHRG MGMT >30: CPT

## 2023-01-30 PROCEDURE — 33285 INSJ SUBQ CAR RHYTHM MNTR: CPT

## 2023-01-30 PROCEDURE — 93325 DOPPLER ECHO COLOR FLOW MAPG: CPT | Mod: 26

## 2023-01-30 RX ORDER — ALBUTEROL 90 UG/1
2 AEROSOL, METERED ORAL
Qty: 0 | Refills: 0 | DISCHARGE

## 2023-01-30 RX ORDER — AMLODIPINE BESYLATE 2.5 MG/1
5 TABLET ORAL DAILY
Refills: 0 | Status: DISCONTINUED | OUTPATIENT
Start: 2023-01-30 | End: 2023-01-30

## 2023-01-30 RX ORDER — ATORVASTATIN CALCIUM 80 MG/1
1 TABLET, FILM COATED ORAL
Qty: 30 | Refills: 0
Start: 2023-01-30 | End: 2023-02-28

## 2023-01-30 RX ORDER — ATORVASTATIN CALCIUM 80 MG/1
1 TABLET, FILM COATED ORAL
Qty: 0 | Refills: 0 | DISCHARGE

## 2023-01-30 RX ORDER — ATORVASTATIN CALCIUM 80 MG/1
40 TABLET, FILM COATED ORAL AT BEDTIME
Refills: 0 | Status: DISCONTINUED | OUTPATIENT
Start: 2023-01-30 | End: 2023-01-30

## 2023-01-30 RX ORDER — CLOPIDOGREL BISULFATE 75 MG/1
1 TABLET, FILM COATED ORAL
Qty: 30 | Refills: 0
Start: 2023-01-30 | End: 2023-02-28

## 2023-01-30 RX ORDER — ALBUTEROL 90 UG/1
2 AEROSOL, METERED ORAL
Qty: 0 | Refills: 0 | DISCHARGE
Start: 2023-01-30

## 2023-01-30 RX ORDER — ASPIRIN/CALCIUM CARB/MAGNESIUM 324 MG
1 TABLET ORAL
Qty: 30 | Refills: 0
Start: 2023-01-30 | End: 2023-02-28

## 2023-01-30 RX ADMIN — Medication 81 MILLIGRAM(S): at 12:08

## 2023-01-30 RX ADMIN — MAGNESIUM OXIDE 400 MG ORAL TABLET 400 MILLIGRAM(S): 241.3 TABLET ORAL at 12:08

## 2023-01-30 RX ADMIN — AMLODIPINE BESYLATE 5 MILLIGRAM(S): 2.5 TABLET ORAL at 12:04

## 2023-01-30 RX ADMIN — MAGNESIUM OXIDE 400 MG ORAL TABLET 400 MILLIGRAM(S): 241.3 TABLET ORAL at 07:04

## 2023-01-30 RX ADMIN — POLYETHYLENE GLYCOL 3350 17 GRAM(S): 17 POWDER, FOR SOLUTION ORAL at 12:07

## 2023-01-30 RX ADMIN — CLOPIDOGREL BISULFATE 75 MILLIGRAM(S): 75 TABLET, FILM COATED ORAL at 12:04

## 2023-01-30 NOTE — DISCHARGE NOTE PROVIDER - PROVIDER TOKENS
PROVIDER:[TOKEN:[[id number]],FOLLOWUP:[1 week]],PROVIDER:[TOKEN:[[id number]],FOLLOWUP:[2 weeks]],PROVIDER:[TOKEN:[[id number]],FOLLOWUP:[2 weeks]] PROVIDER:[TOKEN:[[id number]],FOLLOWUP:[1 week]],PROVIDER:[TOKEN:[[id number]],FOLLOWUP:[2 weeks]],PROVIDER:[TOKEN:[[id number]],FOLLOWUP:[2 weeks]],PROVIDER:[TOKEN:[[id number]],FOLLOWUP:[2 weeks]]

## 2023-01-30 NOTE — CHART NOTE - NSCHARTNOTEFT_GEN_A_CORE
POST OPERATIVE PROCEDURAL DOCUMENTATION  PRE-OP DIAGNOSIS:  CVA    POST-OP DIAGNOSIS:  no cardioembolic etiology for CVA    PROCEDURE: Transesophageal echocardiogram    Primary Physician:  Dr. Antony   Assistant: Dr. Peterson    ANESTHESIA TYPE  [  ] General Anesthesia  [ x ] Conscious Sedation  [  ] Local/Regional    CONDITION  [  ] Critical  [  ] Serious  [  ] Fair  [ x ] Good    SPECIMENS REMOVED (IF APPLICABLE): N/A    IMPLANTS (IF APPLICABLE): None    ESTIMATED BLOOD LOSS: None    COMPLICATIONS: None    FINDINGS:    After risks and benefits of procedures were explained, informed consent was obtained and placed in chart. Refer to Anesthesia note for sedation details.  The KEILA probe was passed into the esophagus without difficulty.  Transesophageal and transgastric images were obtained.  The KEILA probe was removed without difficulty and examined.  There was no evidence for bleeding.  The patient tolerated the procedure well without any immediate KEILA-related complications.      Preliminary Findings:  LA:  normal  BECKY: Left atrial appendage was clear of clot and smoke.  LV: LVEF normal  MV: Mild MR, no evidence of MS.   AV: Mild AI, moderate AS present  RA: normal  TV: no TR.   PV: trace PI.   IAS: PFO present based on bubble studies  Aorta:  simple atheroma of aortic arch / desc aorta    DIAGNOSIS/IMPRESSION:    PLAN OF CARE:  return to floor  f/u with cardiologist POST OPERATIVE PROCEDURAL DOCUMENTATION    PRE-OP DIAGNOSIS:  CVA    POST-OP DIAGNOSIS:  No BECKY thrombus  +PFO with a small LTR shunt    PROCEDURE: Transesophageal echocardiogram    Primary Physician:  Dr. Antony   Assistant: Dr. Peterson    ANESTHESIA TYPE  [  ] General Anesthesia  [ x ] Conscious Sedation  [  ] Local/Regional    CONDITION  [  ] Critical  [  ] Serious  [  ] Fair  [ x ] Good    SPECIMENS REMOVED (IF APPLICABLE): N/A    IMPLANTS (IF APPLICABLE): None    ESTIMATED BLOOD LOSS: None    COMPLICATIONS: None    FINDINGS:    After risks and benefits of procedures were explained, informed consent was obtained and placed in chart. Refer to Anesthesia note for sedation details.  The KEILA probe was passed into the esophagus without difficulty.  Transesophageal and transgastric images were obtained.  The KEILA probe was removed without difficulty and examined.  There was no evidence for bleeding.  The patient tolerated the procedure well without any immediate KEILA-related complications.      Preliminary Findings:  LVEF >55%  BECKY: No thrombus  MV: Mild MR, no evidence of MS.   AV: Mild-mod AS, mild AI  IAS: +PFO with a small LTR shunt  Aorta:  simple atheroma of aortic arch / desc aorta    DIAGNOSIS/IMPRESSION:  No BECKY thrombus  +PFO with a small LTR shunt

## 2023-01-30 NOTE — PROGRESS NOTE ADULT - TIME BILLING
Review of imaging and chart; obtaining history; examination of pt; discussion and coordination of care.
time spent on review of labs, imaging studies, old records, obtaining history, personally examining patient, counselling and communicating with patient/ family, entering orders for medications/tests/etc, discussions with other health care providers, documentation in electronic health records, independent interpretation of labs, imaging/procedure results and care coordination.
time spent on review of labs, imaging studies, old records, obtaining history, personally examining patient, counselling and communicating with patient/ family, entering orders for medications/tests/etc, discussions with other health care providers, documentation in electronic health records, independent interpretation of labs, imaging/procedure results and care coordination.

## 2023-01-30 NOTE — PROGRESS NOTE ADULT - REASON FOR ADMISSION
sudden loss of vision

## 2023-01-30 NOTE — PROGRESS NOTE ADULT - ASSESSMENT
73 years old male with PMH of HTN, HLD presented to ED with sudden onset of loss of vision in left eye started 2.5 hours prior to arrival to ED. s/p Tenectaplase on 01/26 with stable 24h CTH.  CTA with ICAD, L ICA stenosis 70%. MRI revealed Acute R PCA infarct with trace petechial hemorrhagic transformation, chronic R BG lacunar infarct. Most likely etiology of R PCA infarct is cardioembolic. Vision changes today c/f occipital seizure pending eeg. KEILA and ILR today.     # Neuro: Acute right PCA territory infarct i/s/o ICAD  - cw ASA 81 mg and Plavix 75 mg daily x 30 days  - Reduce atorvastatin 80 to 40mg (LDL 30)   - MRI reviewed  - Stroke education.    #Left-sided vision changes  - EEG today    # ICAD with 70% L ICA stenosis  - Outpatient Endovascular f/u    Cards  #HTN  - permissive hypertension, Goal -180  - Start amlodipine 5mg (home med 10mg)  - Stroke Code EKG Results:    Diagnosis Line Normal sinus rhythm  Left ventricular hypertrophy with QRS widening and repolarization abnormality  - f/u KEILA results  - Pending ILR   - TTE; Summary:   1. Normal global left ventricular systolic function.   2. LV Ejection Fraction by Fan's Method with a biplane EF of 65 %.   3. Mildly enlarged right ventricle. RV systolic function is normal.   4. Severe mitral annular calcification.   5. Moderate aortic valve stenosis with peak AV velocity 3.46 m/s, RACH   1.18 cm^2, and MG 25.0 mmHg.   6. Mild aortic regurgitation.   7. Mild tricuspid regurgitation.   8. Estimated pulmonary artery systolic pressure is 45.4 mmHg assuming a   right atrial pressure of 13 mmHg,which is consistent with mild pulmonary   hypertension.      #HLD  - high dose statin as above in CVA  - LDL results: 30    Pulm  - call provider if SPO2 < 94%    GI  #Nutrition/Fluids/Electrolytes   - replete K<4 and Mg <2  - Diet::DASh/TLC  - IVF: IF BPow can start NS 0.9%    Renal  - daily BMP    Infectious Disease  - Stroke Code CXR results: pending    Endocrine  #DM  - A1C results: 5.7  - ISS none    - TSH results: 0.94      Heme:  - Thrombocytopenia:  - f/u daily PLT count    DVT Prophylaxis  - Lovenox 40 mg sq (resume today)    Code status: full code  Dispo: Home tomorrow    Discussed with Neurology Attending

## 2023-01-30 NOTE — DISCHARGE NOTE PROVIDER - NSDCMRMEDTOKEN_GEN_ALL_CORE_FT
Adult Aspirin Regimen 81 mg oral delayed release tablet: 1 tab(s) orally once a day  albuterol 90 mcg/inh inhalation aerosol: 2 puff(s) inhaled every 6 hours, As needed, Shortness of Breath and/or Wheezing  amLODIPine 10 mg oral tablet: 1 tab(s) orally once a day  Lipitor 40 mg oral tablet: 1 tab(s) orally once a day (at bedtime)  Plavix 75 mg oral tablet: 1 tab(s) orally once a day

## 2023-01-30 NOTE — PROGRESS NOTE ADULT - SUBJECTIVE AND OBJECTIVE BOX
Electrophysiology Brief Post-Op Note    I have personally seen and examined the patient.  I agree with the history and physical which I have reviewed and noted any changes below.  01-30-23 @ 18:04    PRE-OP DIAGNOSIS: Cryptogenic CVA    POST-OP DIAGNOSIS:  Cryptogenic CVA    PROCEDURE: Loop Implant    Physician: Dr. Stewart  Assistant: JOSE CARLOS Ash  Followed by: Papito    ESTIMATED BLOOD LOSS:  2  mL    ANESTHESIA TYPE:  [  ]General Anesthesia  [  ] Sedation  [X  ] Local/Regional    CONDITION  [  ] Critical  [  ] Serious  [  ]Fair  [ X ]Good    SPECIMENS REMOVED (IF APPLICABLE):  none    IMPLANTS (IF APPLICABLE)  Loop Recorder ([employer])    FINDINGS  PLAN OF CARE  - F/U 3-4 weeks  - May remove bandaid in 2 days  - May shower in 48 hours

## 2023-01-30 NOTE — PROGRESS NOTE ADULT - ATTENDING COMMENTS
Pt is a 72 yo M with PMHx of HTN, HLD, NSTEMI, AAA, who presented with left sided vision loss. S/p TNK. NIHSS 1 for left homonymous superior quadrantanopia. mRS 2. Pt also notes brief episodes of colorful vision change in same territory.     Impr: acute ischemic stroke in R PCA territory  CTA head/neck with some ICAD, however no significant ICAD in R PCA territory.   KEILA without clot/smoke, however noted presence of PFO.   Recommend BLE venous doppler and ILR placement. Also to obtain rEEG to ro seizure.   Extensive conversation regarding importance of obtaining remainder of studies and recommending acute rehab. However pt is still adamant that he be discharge this evening, states that he will have whatever studies are able to be completed by then. Refusing inpatient rehab. Also discussed importance of driving precautions due to vision loss.   PTA: none-> DAPT x 30 days and high intensity statin  F/u in stroke clinic in 1-2 weeks

## 2023-01-30 NOTE — DISCHARGE NOTE PROVIDER - HOSPITAL COURSE
Hospital course:  73y Male with PMH of HTN, HLD presented to ED with suddne loss of vision in left eye, 2.5 houtrs prior to arrival. Stroke code was activated NIHSS:3, CTH: orlando robison he  , Tenecteplase was administered. CTA: Left ICA diease and Right PCA occlusion. 24 hour post TNk imaging was stable. Patient was started on DAPT. MRI of brain showed acute right PCA territory infarct with trace petechial hemorrhage. His KEILA showed a PFO. The paln is also to place ILR for longterm monitoring, EEG to r/o seizure. LE doppler to r/o DVT. PT recommended inpatient rehab.   Patient declined to go to rehab and wants to go home today regardless of work up being incomplete. Patient has been counselled multiple times on the importance of getting all the workup done and the consequences of not doing so including but not limited to recurrent stroke, worsening of stroke. Despite all counselling patient is adamant on leaving today.   If ILR placement is not done today, wpatient will follow up outpatient. Follow up EEG and LE doppler result.      Physical exam at discharge:  Mental status: Awake, alert and oriented x3.  Recent and remote memory intact.  Naming, repetition and comprehension intact.  Attention/concentration intact.  No dysarthria, no aphasia.  Fund of knowledge appropriate.    Cranial nerves: Pupils equally round and reactive to light, L quandrantinopia, no nystagmus, extraocular muscles intact, V1 through V3 intact bilaterally and symmetric, face symmetric, hearing intact to finger rub, palate elevation symmetric, tongue was midline.  Motor:  MRC grading 5/5 b/l UE/LE.   strength 5/5.  Normal tone and bulk.  No abnormal movements.    Sensation: Intact to light touch, proprioception, and pinprick.   Coordination: No dysmetria on finger-to-nose and heel-to-shin.  No dysdiadokinesia.  Reflexes: 2+ in bilateral UE/LE, downgoing toes bilaterally. (-) Hall.  Gait: Narrow and steady. No ataxia.  Romberg negative  NIHSS 1 (L quandrantinopia)    New medications on discharge: ASA, Palvix, Statin   Test to be followed up: EEG, LE doppler  Imaging to be done as outpatient: ILR placement  Further outpatient workup: Stroke clinic follow up, Cardiology, EP   Hospital course:  73y Male with PMH of HTN, HLD presented to ED with suddne loss of vision in left eye, 2.5 houtrs prior to arrival. Stroke code was activated NIHSS:3, CTH: orlando robison he  , Tenecteplase was administered. CTA: Left ICA diease and Right PCA occlusion. 24 hour post TNk imaging was stable. Patient was started on DAPT. MRI of brain showed acute right PCA territory infarct with trace petechial hemorrhage. His KEILA showed a PFO. The paln is also to place ILR for longterm monitoring, EEG to r/o seizure. LE doppler to r/o DVT. PT recommended inpatient rehab.   Patient declined to go to rehab and wants to go home today regardless of work up being incomplete. Patient has been counselled multiple times on the importance of getting all the workup done and the consequences of not doing so including but not limited to recurrent stroke, worsening of stroke. Despite all counselling patient is adamant on leaving today.   If ILR placement is not done today, wpatient will follow up outpatient. Follow up EEG and LE doppler result.      Physical exam at discharge:  Mental status: Awake, alert and oriented x3.  Recent and remote memory intact.  Naming, repetition and comprehension intact.  Attention/concentration intact.  No dysarthria, no aphasia.  Fund of knowledge appropriate.    Cranial nerves: Pupils equally round and reactive to light, L quandrantinopia, no nystagmus, extraocular muscles intact, V1 through V3 intact bilaterally and symmetric, face symmetric, hearing intact to finger rub, palate elevation symmetric, tongue was midline.  Motor:  MRC grading 5/5 b/l UE/LE.   strength 5/5.  Normal tone and bulk.  No abnormal movements.    Sensation: Intact to light touch, proprioception, and pinprick.   Coordination: No dysmetria on finger-to-nose and heel-to-shin.  No dysdiadokinesia.  Reflexes: 2+ in bilateral UE/LE, downgoing toes bilaterally. (-) Hall.  Gait: Narrow and steady. No ataxia.  Romberg negative  NIHSS 1 (L quandrantinopia)    New medications on discharge: ASA, Palvix, Statin   Test to be followed up: EEG, LE doppler  Imaging to be done as outpatient: ILR placement  Further outpatient workup: Stroke clinic follow up, Cardiology, EP      Stroke attending attestation:  Pt is a 74 yo M with PMHx of HTN, HLD, NSTEMI, AAA, who presented with left sided vision loss. S/p TNK. NIHSS 1 for left homonymous superior quadrantanopia. mRS 2. Pt also notes brief episodes of colorful vision change in same territory.     Impr: acute ischemic stroke in R PCA territory  CTA head/neck with some ICAD, however no significant ICAD in R PCA territory.   KEILA without clot/smoke, however noted presence of PFO.   Recommend BLE venous doppler and ILR placement. Also to obtain rEEG to r/o seizure.   Extensive conversation regarding importance of obtaining remainder of studies and recommending acute rehab. However pt is still adamant that he be discharge this evening, states that he will have whatever studies are able to be completed by then. Refusing inpatient rehab. Also discussed importance of driving precautions due to vision loss.   PTA: none-> DAPT x 30 days and high intensity statin  F/u in stroke clinic in 1-2 weeks .

## 2023-01-30 NOTE — PROGRESS NOTE ADULT - SUBJECTIVE AND OBJECTIVE BOX
Neurology Progress Note    Interval History:  The patient was seen and examined at the bedside. Endorsed "vision changes" of left side but unable to describe if color, spots, darkness etc.       PAST MEDICAL & SURGICAL HISTORY:  Non-ST elevation (NSTEMI) myocardial infarction    Kidney calculi    Abdominal aortic aneurysm (AAA) without rupture    HTN (hypertension)    HLD (hyperlipidemia)    Aortic stenosis    2019 novel coronavirus disease (COVID-19)  5/2022    No significant past surgical history            Medications:  albuterol    90 MICROgram(s) HFA Inhaler 2 Puff(s) Inhalation every 6 hours PRN  amLODIPine   Tablet 5 milliGRAM(s) Oral daily  aspirin enteric coated 81 milliGRAM(s) Oral daily  atorvastatin 40 milliGRAM(s) Oral at bedtime  clopidogrel Tablet 75 milliGRAM(s) Oral daily  magnesium oxide 400 milliGRAM(s) Oral three times a day with meals  polyethylene glycol 3350 17 Gram(s) Oral daily  senna 2 Tablet(s) Oral at bedtime      Vital Signs Last 24 Hrs  T(C): 36.6 (30 Jan 2023 12:53), Max: 36.7 (29 Jan 2023 20:45)  T(F): 97.9 (30 Jan 2023 04:45), Max: 98.1 (29 Jan 2023 20:45)  HR: 63 (30 Jan 2023 12:53) (52 - 64)  BP: 162/80 (30 Jan 2023 12:53) (139/52 - 162/80)  BP(mean): 115 (30 Jan 2023 12:53) (86 - 115)  RR: 18 (30 Jan 2023 12:53) (18 - 18)  SpO2: 97% (30 Jan 2023 12:53) (97% - 97%)    Parameters below as of 30 Jan 2023 12:53    O2 Flow (L/min): 18      Neurological Exam:   Mental status: Awake, alert and oriented x3.  Recent and remote memory intact.  Naming, repetition and comprehension intact.  Attention/concentration intact.  No dysarthria, no aphasia.  Fund of knowledge appropriate.    Cranial nerves: Pupils equally round and reactive to light, L quandrantinopia, no nystagmus, extraocular muscles intact, V1 through V3 intact bilaterally and symmetric, face symmetric, hearing intact to finger rub, palate elevation symmetric, tongue was midline.  Motor:  MRC grading 5/5 b/l UE/LE.   strength 5/5.  Normal tone and bulk.  No abnormal movements.    Sensation: Intact to light touch, proprioception, and pinprick.   Coordination: No dysmetria on finger-to-nose and heel-to-shin.  No dysdiadokinesia.  Reflexes: 2+ in bilateral UE/LE, downgoing toes bilaterally. (-) Hall.  Gait: Narrow and steady. No ataxia.  Romberg negative  NIHSS 1 (L quandrantinopia)    Labs:  CBC Full  -  ( 30 Jan 2023 05:22 )  WBC Count : 6.36 K/uL  RBC Count : 3.81 M/uL  Hemoglobin : 12.5 g/dL  Hematocrit : 36.0 %  Platelet Count - Automated : 117 K/uL  Mean Cell Volume : 94.5 fL  Mean Cell Hemoglobin : 32.8 pg  Mean Cell Hemoglobin Concentration : 34.7 g/dL  Auto Neutrophil # : x  Auto Lymphocyte # : x  Auto Monocyte # : x  Auto Eosinophil # : x  Auto Basophil # : x  Auto Neutrophil % : x  Auto Lymphocyte % : x  Auto Monocyte % : x  Auto Eosinophil % : x  Auto Basophil % : x    01-30    137  |  104  |  20  ----------------------------<  89  4.2   |  25  |  0.6<L>    Ca    9.1      30 Jan 2023 05:22  Phos  3.5     01-30  Mg     2.1     01-30    TPro  6.2  /  Alb  3.9  /  TBili  1.1  /  DBili  x   /  AST  15  /  ALT  14  /  AlkPhos  88  01-30    LIVER FUNCTIONS - ( 30 Jan 2023 05:22 )  Alb: 3.9 g/dL / Pro: 6.2 g/dL / ALK PHOS: 88 U/L / ALT: 14 U/L / AST: 15 U/L / GGT: x           PT/INR - ( 29 Jan 2023 06:27 )   PT: 14.20 sec;   INR: 1.24 ratio         PTT - ( 29 Jan 2023 06:27 )  PTT:33.9 sec

## 2023-01-30 NOTE — DISCHARGE NOTE PROVIDER - CARE PROVIDERS DIRECT ADDRESSES
,DirectAddress_Unknown,[email or web address] ,DirectAddress_Unknown,[email or web address],DirectAddress_Unknown

## 2023-01-30 NOTE — DISCHARGE NOTE PROVIDER - CARE PROVIDER_API CALL
Jamey Moulton)  Neurology  [address] [place] [address]  Phone: [phone]  Fax: [phone]  Follow Up Time: 1 week    Trent Cobos)  Cardiac Electrophysiology; [facility]; [facility]  [address] [place] [address]  Phone: [phone]  Fax: [phone]  Follow Up Time: 2 weeks    Jeni Antony)  Cardiovascular Disease; Internal Medicine  [address] [place] [address]  Phone: [phone]  Fax: [phone]  Follow Up Time: 2 weeks   Jamey Moulton)  Neurology  [address] [place] [address]  Phone: [phone]  Fax: [phone]  Follow Up Time: 1 week    Trent Cobos)  Cardiac Electrophysiology; Cardiovascular Disease; [facility]  [address] [place] [address]  Phone: [phone]  Fax: [phone]  Follow Up Time: 2 weeks    Jeni Antony)  Cardiovascular Disease; Internal Medicine  [address] [place] [address]  Phone: [phone]  Fax: [phone]  Follow Up Time: 2 weeks    Gui Choi; [facility])  Surgery  Neurosurgery  [address] [place] [address]  Phone: [phone]  Fax: [phone]  Follow Up Time: 2 weeks

## 2023-01-30 NOTE — CONSULT NOTE ADULT - SUBJECTIVE AND OBJECTIVE BOX
Patient is a 73y old  Male who presents with a chief complaint of sudden loss of vision (30 Jan 2023 18:04)      HPI:  73 years old male with PMH of HTN, HLD presented to Ed with sudden onset of loss of vision this morning. he was doing well until 9:00 am when he suddenly noticed he developed blurry vision in both eyes . He also noticed that he was feeling dizzy at the time. No H/O fall or recent head trauma. Denies any slurred speech, trouble speaking, weakness. He never had similar symptoms in the past. N H/O stroke in the past.  (26 Jan 2023 15:57)      PAST MEDICAL & SURGICAL HISTORY:  Non-ST elevation (NSTEMI) myocardial infarction      Kidney calculi      Abdominal aortic aneurysm (AAA) without rupture      HTN (hypertension)      HLD (hyperlipidemia)      Aortic stenosis      2019 novel coronavirus disease (COVID-19)  5/2022      No significant past surgical history                          PREVIOUS DIAGNOSTIC TESTING:      ECHO  FINDINGS:    STRESS  FINDINGS:    CATHETERIZATION  FINDINGS:    MEDICATIONS  (STANDING):  amLODIPine   Tablet 5 milliGRAM(s) Oral daily  aspirin enteric coated 81 milliGRAM(s) Oral daily  atorvastatin 40 milliGRAM(s) Oral at bedtime  clopidogrel Tablet 75 milliGRAM(s) Oral daily  magnesium oxide 400 milliGRAM(s) Oral three times a day with meals  polyethylene glycol 3350 17 Gram(s) Oral daily  senna 2 Tablet(s) Oral at bedtime    MEDICATIONS  (PRN):  albuterol    90 MICROgram(s) HFA Inhaler 2 Puff(s) Inhalation every 6 hours PRN Shortness of Breath and/or Wheezing      FAMILY HISTORY:  FH: CAD (coronary artery disease) (Father, Sibling)        SOCIAL HISTORY:    CIGARETTES:    ALCOHOL:        Vital Signs Last 24 Hrs  T(C): 36.2 (30 Jan 2023 15:40), Max: 36.6 (30 Jan 2023 04:45)  T(F): 97.2 (30 Jan 2023 15:40), Max: 97.9 (30 Jan 2023 04:45)  HR: 58 (30 Jan 2023 15:40) (58 - 63)  BP: 115/57 (30 Jan 2023 15:40) (115/57 - 162/80)  BP(mean): 115 (30 Jan 2023 12:53) (115 - 115)  RR: 18 (30 Jan 2023 15:40) (18 - 18)  SpO2: 97% (30 Jan 2023 12:53) (97% - 97%)    Parameters below as of 30 Jan 2023 12:53    O2 Flow (L/min): 18              INTERPRETATION OF TELEMETRY:    ECG:    I&O's Detail      LABS:                        12.5   6.36  )-----------( 117      ( 30 Jan 2023 05:22 )             36.0     01-30    137  |  104  |  20  ----------------------------<  89  4.2   |  25  |  0.6<L>    Ca    9.1      30 Jan 2023 05:22  Phos  3.5     01-30  Mg     2.1     01-30    TPro  6.2  /  Alb  3.9  /  TBili  1.1  /  DBili  x   /  AST  15  /  ALT  14  /  AlkPhos  88  01-30        PT/INR - ( 29 Jan 2023 06:27 )   PT: 14.20 sec;   INR: 1.24 ratio         PTT - ( 29 Jan 2023 06:27 )  PTT:33.9 sec    I&O's Summary    BNP  RADIOLOGY & ADDITIONAL STUDIES:

## 2023-01-30 NOTE — PROGRESS NOTE ADULT - ASSESSMENT
73 years old male with PMH of HTN, HLD presented to ED with sudden onset of loss of vision in left eye started 2.5 hours prior to arrival to ED. s/p Tenectaplase on 01/26. Patient admitted to stroke unit. His 24 hr post TNK imaging is stable    #  Acute right PCA territory infarct: / Distal R PCA severe stenosis  - Start ASA 81 mg Daily  - Start Plavix 75 mg daily  -  statin  - MRI reviewed  - Stroke education.  - neuro checks q4  -monitor on tele for afib  -TTE  1. Normal global left ventricular systolic function.   2. LV Ejection Fraction by Fan's Method with a biplane EF of 65 %.   3. Mildly enlarged right ventricle. RV systolic function is normal.   4. Severe mitral annular calcification.   5. Moderate aortic valve stenosis with peak AV velocity 3.46 m/s, RACH 1.18 cm^2, and MG 25.0 mmHg.   6. Mild aortic regurgitation.   7. Mild tricuspid regurgitation.   8. Estimated pulmonary artery systolic pressure is 45.4 mmHg assuming a   right atrial pressure of 13 mmHg,which is consistent with mild pulmonary hypertension.  < from: CT Angio Neck Stroke Protocol w/ IV Cont (01.26.23 @ 12:42) >  CTA neck-  1.  Right carotid: Atheromatous plaque at the carotid bifurcation with   mild (<50%) stenosis of the distal CCA, severe (70-80%) stenosis of the   ICA originand moderate stenosis of the ECA origin.  2.  Left carotid: Calcific plaque at the left carotid bifurcation with   severe (70-80%) stenosis of the proximal ICA and moderate stenosis of the   proximal ECA.  3.  Vertebral arteries: Calcific plaque at the left vertebral artery   origin with likely moderate-severe stenosis. Scattered mild-moderate   stenoses bilaterally.    CTA head-  1.  Calcific plaque of the carotid siphons with moderate stenoses bilaterally. The anterior and middle cerebral arteries are patent.  2.  Moderate stenoses of the left MCA M1 segment.  3.  Moderate-severe calcific stenosis of the V4 segment of the left vertebral artery.  4.  Severe stenosis or occlusion of the distal right PCA.    Perfusion- Small perfusion abnormality (penumbra) in the right occipital lobe.    Other-  1.  Appearance of the right vocal cord suggesting right vocal cord paralysis.  Correlate clinically.  2.  Polypoid opacities within the nasal cavity.    < end of copied text >  -awaiting KEILA, ILR    #HTN  - permissive hypertension, Goal -180  - hold home blood pressure medication for now w/ gradual BP lowering    #HLD  - statin as above in CVA (consider lowering the dose)  - LDL results: 30    COPD  nebs    H/o CAD, PVCs, Bradycardia  ASA, PLavix, statin  pt a-Sx  check Mg 2+ (keep K>4, Mg>2)  monitor on tele  can give low dose BB if BP, HR tolerate  outpt cardio f/u    #Nutrition/Fluids/Electrolytes   - replete K<4 and Mg <2  - Diet::DASh/TLC    Pre-DM  - A1C results: 5.7  - TSH results: 0.94    Mild Thrombocytopenia:  - Plt:123  - monitor    Weight loss  recommend outpt f/u w/ PMD to ensure pt is uptodate on primary malignancy screening, etc    DVT Prophylaxis  - Lovenox 40 mgs/c daily, SCDs    Code status: full code  Dispo: stroke unit    #Progress Note Handoff  Pending: Clinical improvement and stability__x____TTE, ILR_______PT____x____  Pt/Family discussion: Pt informed and agrees with the current plan  Disposition: Home______/SNF_______/4A______/To be determined____x____    My note supersedes the residents note should a discrepancy arise.    Chart and notes personally reviewed.  Care Discussed with Consultants/Other Providers/ Housestaff [ x] YES [ ] NO   Radiology, labs, old records personally reviewed.    discussed w/ housestaff, nursing, case management, neuro team   73 years old male with PMH of HTN, HLD presented to ED with sudden onset of loss of vision in left eye started 2.5 hours prior to arrival to ED. s/p Tenectaplase on 01/26. Patient admitted to stroke unit. His 24 hr post TNK imaging is stable    #  Acute right PCA territory infarct: / Distal R PCA severe stenosis / PFO  - Start ASA 81 mg Daily  - Start Plavix 75 mg daily  -  statin  - MRI reviewed  - Stroke education.  - neuro checks q4  -monitor on tele for afib  -TTE  1. Normal global left ventricular systolic function.   2. LV Ejection Fraction by Fan's Method with a biplane EF of 65 %.   3. Mildly enlarged right ventricle. RV systolic function is normal.   4. Severe mitral annular calcification.   5. Moderate aortic valve stenosis with peak AV velocity 3.46 m/s, RACH 1.18 cm^2, and MG 25.0 mmHg.   6. Mild aortic regurgitation.   7. Mild tricuspid regurgitation.   8. Estimated pulmonary artery systolic pressure is 45.4 mmHg assuming a   right atrial pressure of 13 mmHg,which is consistent with mild pulmonary hypertension.  < from: CT Angio Neck Stroke Protocol w/ IV Cont (01.26.23 @ 12:42) >  CTA neck-  1.  Right carotid: Atheromatous plaque at the carotid bifurcation with   mild (<50%) stenosis of the distal CCA, severe (70-80%) stenosis of the   ICA originand moderate stenosis of the ECA origin.  2.  Left carotid: Calcific plaque at the left carotid bifurcation with   severe (70-80%) stenosis of the proximal ICA and moderate stenosis of the   proximal ECA.  3.  Vertebral arteries: Calcific plaque at the left vertebral artery   origin with likely moderate-severe stenosis. Scattered mild-moderate   stenoses bilaterally.    CTA head-  1.  Calcific plaque of the carotid siphons with moderate stenoses bilaterally. The anterior and middle cerebral arteries are patent.  2.  Moderate stenoses of the left MCA M1 segment.  3.  Moderate-severe calcific stenosis of the V4 segment of the left vertebral artery.  4.  Severe stenosis or occlusion of the distal right PCA.    Perfusion- Small perfusion abnormality (penumbra) in the right occipital lobe.    Other-  1.  Appearance of the right vocal cord suggesting right vocal cord paralysis.  Correlate clinically.  2.  Polypoid opacities within the nasal cavity.    < end of copied text >  -awaiting ILR  -KEILA w/ PFO: LE doppler ordered if pt agrees to stay  -counselled about no driving    #HTN  -  gradual BP lowering    #HLD  - statin as above in CVA (consider lowering the dose)  - LDL results: 30    COPD  nebs    H/o CAD, PVCs, Bradycardia  ASA, PLavix, statin  pt a-Sx  check Mg 2+ (keep K>4, Mg>2)  monitor on tele  can give low dose BB if BP, HR tolerate  outpt cardio f/u    #Nutrition/Fluids/Electrolytes   - replete K<4 and Mg <2  - Diet::DASh/TLC    Pre-DM  - A1C results: 5.7  - TSH results: 0.94    Mild Thrombocytopenia:  - Plt:123  - monitor    Weight loss  recommend outpt f/u w/ PMD to ensure pt is uptodate on primary malignancy screening, etc    DVT Prophylaxis  - Lovenox 40 mgs/c daily, SCDs    Code status: full code  Dispo: stroke unit    #Progress Note Handoff  Pending: Clinical improvement and stability__x___LE doppler, ILR_______PT____x____  Pt/Family discussion: Pt informed and agrees with the current plan  Disposition: Home_____    My note supersedes the residents note should a discrepancy arise.    Chart and notes personally reviewed.  Care Discussed with Consultants/Other Providers/ Housestaff [ x] YES [ ] NO   Radiology, labs, old records personally reviewed.    discussed w/ housestaff, nursing, case management, neuro team

## 2023-01-30 NOTE — PROGRESS NOTE ADULT - SUBJECTIVE AND OBJECTIVE BOX
EDGAR HENAO  73y  Male    Patient is a 73y old  Male who presents with a chief complaint of sudden loss of vision (28 Jan 2023 06:55)      HPI:  73 years old male with PMH of CAD, HTN, HLD presented to Ed with sudden onset of loss of vision this morning. he was doing well until 9:00 am when he suddenly noticed he developed blurry vision in both eyes . He also noticed that he was feeling dizzy at the time. No H/O fall or recent head trauma. Denies any slurred speech, trouble speaking, weakness. He never had similar symptoms in the past. N H/O stroke in the past.  (26 Jan 2023 15:57)    S: Patient was examined and seen at bedside. This morning pt is resting comfortably in bed and reports no new issues or overnight events. No complaints, feels better but still can't see well on the left. Reports wt loss over the last few years. Eager to go home. Going for KEILA, ILR.  Denies CP, SOB, N/V/D/C/AP, cough, F, chills, dizziness, new focal weakness, HA, dysuria, or urinary symptoms, blood in stool.  ROS: all other systems reviewed and are negative    PAST MEDICAL & SURGICAL HISTORY:  Non-ST elevation (NSTEMI) myocardial infarction      Kidney calculi      Abdominal aortic aneurysm (AAA) without rupture      HTN (hypertension)      HLD (hyperlipidemia)      Aortic stenosis      2019 novel coronavirus disease (COVID-19)  5/2022      No significant past surgical history        SOCIAL HISTORY:  Tobacco: former smoker (quit 20yrs ago)  Illicit drugs: negative  Alcohol: social  Family history reviewed and otherwise non-contributory No clotting disorders, CVAs at early age.  ALLERGIES: NKDA        General: NAD. Chronically ill appearing. Thin  HEENT: clean oropharynx, EOMI, no LAD, LUQ hemianopsia  Neck: trachea midline, no thyromegaly  CV: nl S1 S2; no m/r/g  Resp: decreased breath sounds at base  GI: NT/ND/S +BS  MS: no clubbing/cyanosis/edema, +pulses  Neuro: motor, sensory intact; + reflexes  Skin: no rashes, nl turgor  Psychiatric: AA0x3 w/ fair insight and judgement    tele: SR w/ occasional PVCs, nonspecific changes (on my own evaluation of tele monitor)          Home Medications:  Albuterol (Eqv-ProAir HFA) 90 mcg/inh inhalation aerosol: 2 puff(s) inhaled every 6 hours, As Needed (21 Jul 2022 11:42)  amLODIPine 10 mg oral tablet: 1 tab(s) orally once a day (26 Jan 2023 16:04)  atorvastatin 40 mg oral tablet: 1 tab(s) orally once a day (21 Jul 2022 11:42)    MEDICATIONS  (STANDING):  aspirin enteric coated 81 milliGRAM(s) Oral daily  clopidogrel Tablet 75 milliGRAM(s) Oral daily  magnesium oxide 400 milliGRAM(s) Oral three times a day with meals  polyethylene glycol 3350 17 Gram(s) Oral daily  senna 2 Tablet(s) Oral at bedtime    MEDICATIONS  (PRN):  albuterol    90 MICROgram(s) HFA Inhaler 2 Puff(s) Inhalation every 6 hours PRN Shortness of Breath and/or Wheezing    Vital Signs Last 24 Hrs  T(C): 36.6 (30 Jan 2023 04:45), Max: 36.7 (29 Jan 2023 20:45)  T(F): 97.9 (30 Jan 2023 04:45), Max: 98.1 (29 Jan 2023 20:45)  HR: 63 (30 Jan 2023 04:45) (52 - 64)  BP: 162/80 (30 Jan 2023 04:45) (139/52 - 162/80)  BP(mean): 115 (30 Jan 2023 04:45) (86 - 115)  RR: 18 (30 Jan 2023 04:45) (18 - 18)  SpO2: 97% (29 Jan 2023 16:00) (97% - 97%)    Parameters below as of 30 Jan 2023 04:45  Patient On (Oxygen Delivery Method): room air  O2 Flow (L/min): 18    CAPILLARY BLOOD GLUCOSE        LABS:                        12.5   6.36  )-----------( 117      ( 30 Jan 2023 05:22 )             36.0     01-30    137  |  104  |  20  ----------------------------<  89  4.2   |  25  |  0.6<L>    Ca    9.1      30 Jan 2023 05:22  Phos  3.5     01-30  Mg     2.1     01-30    TPro  6.2  /  Alb  3.9  /  TBili  1.1  /  DBili  x   /  AST  15  /  ALT  14  /  AlkPhos  88  01-30    LIVER FUNCTIONS - ( 30 Jan 2023 05:22 )  Alb: 3.9 g/dL / Pro: 6.2 g/dL / ALK PHOS: 88 U/L / ALT: 14 U/L / AST: 15 U/L / GGT: x               PT/INR - ( 29 Jan 2023 06:27 )   PT: 14.20 sec;   INR: 1.24 ratio         PTT - ( 29 Jan 2023 06:27 )  PTT:33.9 sec            Consultant Notes Reviewed:  [x ] YES  [ ] NO  Care Discussed with Consultants/Other Providers/ Housestaff [ x] YES  [ ] NO  Radiology, labs, new studies personally reviewed.                                                                         EDGAR HENAO  73y  Male    Patient is a 73y old  Male who presents with a chief complaint of sudden loss of vision (28 Jan 2023 06:55)      HPI:  73 years old male with PMH of CAD, HTN, HLD presented to Ed with sudden onset of loss of vision this morning. he was doing well until 9:00 am when he suddenly noticed he developed blurry vision in both eyes . He also noticed that he was feeling dizzy at the time. No H/O fall or recent head trauma. Denies any slurred speech, trouble speaking, weakness. He never had similar symptoms in the past. N H/O stroke in the past.  (26 Jan 2023 15:57)  Reports wt loss over the last few years.    S: Patient was examined and seen at bedside. This morning pt is resting comfortably in bed and reports no new issues or overnight events. No complaints, feels better but still can't see well on the left. Wants to go home ASAP, upset its taking a long time. Scheduled for KEILA, ILR today.   Denies CP, SOB, N/V/D/C/AP, cough, F, chills, dizziness, new focal weakness, HA, dysuria, or urinary symptoms, blood in stool.  ROS: all other systems reviewed and are negative    PAST MEDICAL & SURGICAL HISTORY:  Non-ST elevation (NSTEMI) myocardial infarction      Kidney calculi      Abdominal aortic aneurysm (AAA) without rupture      HTN (hypertension)      HLD (hyperlipidemia)      Aortic stenosis      2019 novel coronavirus disease (COVID-19)  5/2022      No significant past surgical history        SOCIAL HISTORY:  Tobacco: former smoker (quit 20yrs ago)  Illicit drugs: negative  Alcohol: social  Family history reviewed and otherwise non-contributory No clotting disorders, CVAs at early age.  ALLERGIES: NKDA        General: NAD. Chronically ill appearing. Thin  HEENT: clean oropharynx, EOMI, no LAD, LUQ hemianopsia  Neck: trachea midline, no thyromegaly  CV: nl S1 S2; no m/r/g  Resp: decreased breath sounds at base  GI: NT/ND/S +BS  MS: no clubbing/cyanosis/edema, +pulses  Neuro: motor, sensory intact; + reflexes  Skin: no rashes, nl turgor  Psychiatric: AA0x3 w/ fair insight and judgement    tele: SR w/ occasional PVCs, nonspecific changes (on my own evaluation of tele monitor)          Home Medications:  Albuterol (Eqv-ProAir HFA) 90 mcg/inh inhalation aerosol: 2 puff(s) inhaled every 6 hours, As Needed (21 Jul 2022 11:42)  amLODIPine 10 mg oral tablet: 1 tab(s) orally once a day (26 Jan 2023 16:04)  atorvastatin 40 mg oral tablet: 1 tab(s) orally once a day (21 Jul 2022 11:42)    MEDICATIONS  (STANDING):  aspirin enteric coated 81 milliGRAM(s) Oral daily  clopidogrel Tablet 75 milliGRAM(s) Oral daily  magnesium oxide 400 milliGRAM(s) Oral three times a day with meals  polyethylene glycol 3350 17 Gram(s) Oral daily  senna 2 Tablet(s) Oral at bedtime    MEDICATIONS  (PRN):  albuterol    90 MICROgram(s) HFA Inhaler 2 Puff(s) Inhalation every 6 hours PRN Shortness of Breath and/or Wheezing    Vital Signs Last 24 Hrs  T(C): 36.6 (30 Jan 2023 04:45), Max: 36.7 (29 Jan 2023 20:45)  T(F): 97.9 (30 Jan 2023 04:45), Max: 98.1 (29 Jan 2023 20:45)  HR: 63 (30 Jan 2023 04:45) (52 - 64)  BP: 162/80 (30 Jan 2023 04:45) (139/52 - 162/80)  BP(mean): 115 (30 Jan 2023 04:45) (86 - 115)  RR: 18 (30 Jan 2023 04:45) (18 - 18)  SpO2: 97% (29 Jan 2023 16:00) (97% - 97%)    Parameters below as of 30 Jan 2023 04:45  Patient On (Oxygen Delivery Method): room air  O2 Flow (L/min): 18    CAPILLARY BLOOD GLUCOSE        LABS:                        12.5   6.36  )-----------( 117      ( 30 Jan 2023 05:22 )             36.0     01-30    137  |  104  |  20  ----------------------------<  89  4.2   |  25  |  0.6<L>    Ca    9.1      30 Jan 2023 05:22  Phos  3.5     01-30  Mg     2.1     01-30    TPro  6.2  /  Alb  3.9  /  TBili  1.1  /  DBili  x   /  AST  15  /  ALT  14  /  AlkPhos  88  01-30    LIVER FUNCTIONS - ( 30 Jan 2023 05:22 )  Alb: 3.9 g/dL / Pro: 6.2 g/dL / ALK PHOS: 88 U/L / ALT: 14 U/L / AST: 15 U/L / GGT: x               PT/INR - ( 29 Jan 2023 06:27 )   PT: 14.20 sec;   INR: 1.24 ratio         PTT - ( 29 Jan 2023 06:27 )  PTT:33.9 sec            Consultant Notes Reviewed:  [x ] YES  [ ] NO  Care Discussed with Consultants/Other Providers/ Housestaff [ x] YES  [ ] NO  Radiology, labs, new studies personally reviewed.

## 2023-02-08 ENCOUNTER — NON-APPOINTMENT (OUTPATIENT)
Age: 73
End: 2023-02-08

## 2023-02-08 ENCOUNTER — APPOINTMENT (OUTPATIENT)
Dept: NEUROLOGY | Facility: CLINIC | Age: 73
End: 2023-02-08
Payer: MEDICARE

## 2023-02-08 VITALS
OXYGEN SATURATION: 99 % | TEMPERATURE: 97.8 F | SYSTOLIC BLOOD PRESSURE: 151 MMHG | DIASTOLIC BLOOD PRESSURE: 64 MMHG | HEIGHT: 71 IN | WEIGHT: 170 LBS | HEART RATE: 57 BPM | BODY MASS INDEX: 23.8 KG/M2
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
## 2023-02-08 DIAGNOSIS — R44.1 VISUAL HALLUCINATIONS: ICD-10-CM

## 2023-02-08 DIAGNOSIS — I65.29 OCCLUSION AND STENOSIS OF UNSPECIFIED CAROTID ARTERY: ICD-10-CM

## 2023-02-08 DIAGNOSIS — M62.542 MUSCLE WASTING AND ATROPHY, NOT ELSEWHERE CLASSIFIED, LEFT HAND: ICD-10-CM

## 2023-02-08 PROCEDURE — 99215 OFFICE O/P EST HI 40 MIN: CPT

## 2023-02-09 DIAGNOSIS — I35.0 NONRHEUMATIC AORTIC (VALVE) STENOSIS: ICD-10-CM

## 2023-02-09 DIAGNOSIS — R63.4 ABNORMAL WEIGHT LOSS: ICD-10-CM

## 2023-02-09 DIAGNOSIS — E78.5 HYPERLIPIDEMIA, UNSPECIFIED: ICD-10-CM

## 2023-02-09 DIAGNOSIS — I25.2 OLD MYOCARDIAL INFARCTION: ICD-10-CM

## 2023-02-09 DIAGNOSIS — I51.7 CARDIOMEGALY: ICD-10-CM

## 2023-02-09 DIAGNOSIS — I63.531 CEREBRAL INFARCTION DUE TO UNSPECIFIED OCCLUSION OR STENOSIS OF RIGHT POSTERIOR CEREBRAL ARTERY: ICD-10-CM

## 2023-02-09 DIAGNOSIS — Z79.82 LONG TERM (CURRENT) USE OF ASPIRIN: ICD-10-CM

## 2023-02-09 DIAGNOSIS — I71.40 ABDOMINAL AORTIC ANEURYSM, WITHOUT RUPTURE, UNSPECIFIED: ICD-10-CM

## 2023-02-09 DIAGNOSIS — Z86.16 PERSONAL HISTORY OF COVID-19: ICD-10-CM

## 2023-02-09 DIAGNOSIS — J44.9 CHRONIC OBSTRUCTIVE PULMONARY DISEASE, UNSPECIFIED: ICD-10-CM

## 2023-02-09 DIAGNOSIS — I10 ESSENTIAL (PRIMARY) HYPERTENSION: ICD-10-CM

## 2023-02-09 DIAGNOSIS — I65.22 OCCLUSION AND STENOSIS OF LEFT CAROTID ARTERY: ICD-10-CM

## 2023-02-09 DIAGNOSIS — R29.703 NIHSS SCORE 3: ICD-10-CM

## 2023-02-09 DIAGNOSIS — Z86.73 PERSONAL HISTORY OF TRANSIENT ISCHEMIC ATTACK (TIA), AND CEREBRAL INFARCTION WITHOUT RESIDUAL DEFICITS: ICD-10-CM

## 2023-02-09 DIAGNOSIS — I49.3 VENTRICULAR PREMATURE DEPOLARIZATION: ICD-10-CM

## 2023-02-09 DIAGNOSIS — R29.701 NIHSS SCORE 1: ICD-10-CM

## 2023-02-09 DIAGNOSIS — I25.10 ATHEROSCLEROTIC HEART DISEASE OF NATIVE CORONARY ARTERY WITHOUT ANGINA PECTORIS: ICD-10-CM

## 2023-02-09 DIAGNOSIS — E11.9 TYPE 2 DIABETES MELLITUS WITHOUT COMPLICATIONS: ICD-10-CM

## 2023-02-09 DIAGNOSIS — H53.132 SUDDEN VISUAL LOSS, LEFT EYE: ICD-10-CM

## 2023-02-09 DIAGNOSIS — D69.6 THROMBOCYTOPENIA, UNSPECIFIED: ICD-10-CM

## 2023-02-09 DIAGNOSIS — I61.9 NONTRAUMATIC INTRACEREBRAL HEMORRHAGE, UNSPECIFIED: ICD-10-CM

## 2023-02-09 DIAGNOSIS — I63.9 CEREBRAL INFARCTION, UNSPECIFIED: ICD-10-CM

## 2023-02-13 PROBLEM — R44.1 VISUAL HALLUCINATION: Status: ACTIVE | Noted: 2023-02-13

## 2023-02-13 NOTE — ADDENDUM
[FreeTextEntry1] : Spoken to Dr. Moulton. She does recommend 24hr aEEG bc Patricio Meredith is usu w/ intrinsic eye disease and Mr. Mohr had a stroke.

## 2023-02-13 NOTE — PHYSICAL EXAM
[FreeTextEntry1] : Focal neurological exam:\par \par MS: Awake, alert, oriented to person, place, situation and time. Normal affect. Follows commands. \par \par Language: Speech is clear, fluent with good repetition & comprehension. No dysarthria. \par \par CNs 2 - 12 intact. EOMI no nystagmus, no diplopia. Homonymous L upper quadrantanopsia. No facial asymmetry b/l, full eye closure strength b/l. Hearing grossly normal. Head turning & shoulder shrug intact b/l. Tongue midline, normal movements, no atrophy. Admits to HALLUCINATIONS of hands and fingers. \par \par Motor: LH thenar muscle atrophy and +fasciculation. LH  4+/5, Muscle strength of b/l UE and b/l LE proximally 5/5. \par \par Reflexes: DTR of biceps, knee and ankle normal \par \par Sensation: Decreased to LT of LH\par \par Cortical: No extinction\par \par Coordination: No dysmetria to FTN.\par \par Gait: No postural instability. Normal stance and tandem gait.\par \par NIHSS 2 (vision/sensory)\par mRS 1\par \par \par
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
## 2023-02-28 ENCOUNTER — APPOINTMENT (OUTPATIENT)
Dept: NEUROLOGY | Facility: CLINIC | Age: 73
End: 2023-02-28
Payer: MEDICARE

## 2023-02-28 PROCEDURE — 95708 EEG WO VID EA 12-26HR UNMNTR: CPT

## 2023-02-28 PROCEDURE — 95719 EEG PHYS/QHP EA INCR W/O VID: CPT

## 2023-03-01 ENCOUNTER — APPOINTMENT (OUTPATIENT)
Dept: NEUROLOGY | Facility: CLINIC | Age: 73
End: 2023-03-01

## 2023-03-01 PROCEDURE — 95700 EEG CONT REC W/VID EEG TECH: CPT

## 2023-03-13 ENCOUNTER — APPOINTMENT (OUTPATIENT)
Dept: ELECTROPHYSIOLOGY | Facility: CLINIC | Age: 73
End: 2023-03-13

## 2023-03-14 ENCOUNTER — NON-APPOINTMENT (OUTPATIENT)
Age: 73
End: 2023-03-14

## 2023-03-15 ENCOUNTER — APPOINTMENT (OUTPATIENT)
Dept: NEUROLOGY | Facility: CLINIC | Age: 73
End: 2023-03-15
Payer: MEDICARE

## 2023-03-15 VITALS
TEMPERATURE: 98 F | BODY MASS INDEX: 23.8 KG/M2 | OXYGEN SATURATION: 98 % | SYSTOLIC BLOOD PRESSURE: 174 MMHG | HEIGHT: 71 IN | WEIGHT: 170 LBS | DIASTOLIC BLOOD PRESSURE: 81 MMHG | HEART RATE: 70 BPM

## 2023-03-15 DIAGNOSIS — R06.02 SHORTNESS OF BREATH: ICD-10-CM

## 2023-03-15 PROCEDURE — 99214 OFFICE O/P EST MOD 30 MIN: CPT

## 2023-03-15 RX ORDER — CLOPIDOGREL BISULFATE 75 MG/1
75 TABLET, FILM COATED ORAL DAILY
Refills: 0 | Status: DISCONTINUED | COMMUNITY
End: 2023-03-15

## 2023-03-15 NOTE — ADDENDUM
[FreeTextEntry1] : Was contacted by EPS, Veronica, who stated that patient was found w/ episode of Afib, and will let me know if AC is recommended.

## 2023-03-15 NOTE — PHYSICAL EXAM
[FreeTextEntry1] : Focal neurological exam:\par \par MS: Awake, alert, oriented to person, place, situation and time. Normal affect. Follows commands. \par \par Language: Speech is clear, fluent with good repetition & comprehension. No dysarthria. \par \par CNs 2 - 12 intact. EOMI no nystagmus, no diplopia. Homonymous L upper quadrantanopsia. No facial asymmetry b/l, full eye closure strength b/l. Hearing grossly normal. Head turning & shoulder shrug intact b/l. Tongue midline, normal movements, no atrophy. Admits to HALLUCINATIONS of hands and fingers. \par \par Motor: LH thenar muscle atrophy and +fasciculation. LH  4+/5, Muscle strength of b/l UE and b/l LE proximally 5/5. \par \par Reflexes: DTR of biceps, knee and ankle normal \par \par Sensation: Decreased to LT of LH\par \par Cortical: No extinction\par \par Coordination: No dysmetria to FTN.\par \par Gait: No postural instability. Normal stance and tandem gait.\par \par NIHSS 2 (vision/sensory)\par mRS 1

## 2023-03-15 NOTE — HISTORY OF PRESENT ILLNESS
[FreeTextEntry1] : Patient is 72 yo RH man with PMHx of HTN, HLD, ?AAA, who presented to the ED on 1/26/23 for sudden vision loss in L eye, NIHSS 3 for complete hemianopsia and ataxia s/p TNK. He was found w/ acute R PCA territory infarct in occipital lobe w/ Trace petechial hemorrhage in setting of SEV stenosis/occlusion of distal R PCA (?thromboembolic vs cardioembolic) s/p ILR. He has R ICA origin SEV likely asymptomatic stenosis. ?Symptomatic L VA MOD-SEV stenosis. L proximal ICA also w/ SEV stenosis.\par \par -Last visit, he endorsed falling out of bed w +HT. CTH 3/14 w/o NEW pathology compared to 1/2023  \par -b/l CUS 3/14/23: Intimal thicking b/l CUS R>L; L CCA MOD stenosis, R ICA w/ 60-79% stenosis; VA flow is ANTEGRADE b/l \par -On Lipitor 40: LDL 44, HDL 71 \par -REEG wnl\par -MiniCog today is 3/5\par -Says was recently called by EPS that at night  while sleeping\par -Says his condition is stable, vision issues are the same, denies any recurrent BEFAST sx \par -Denies any more falls, but says appetite is mediocre, eat salmon on Fridays and drinks Ensure, weight stable\par -Didn't get driving eval\par SHx: formerly in construction\par \par PTA: On statin, no AP \par Remote smoker\par \par

## 2023-03-15 NOTE — ASSESSMENT
[FreeTextEntry1] : Patient is 72 yo RH man with PMHx of HTN, HLD, ?AAA, who presented to the ED on 1/26/23 for sudden vision loss in L eye, NIHSS 3 for complete hemianopsia and ataxia s/p TNK. He was found w/ acute R PCA territory infarct in occipital lobe w/ Trace petechial hemorrhage in setting of SEV stenosis/occlusion of distal R PCA (?thromboembolic vs cardioembolic) s/p ILR. He has R ICA origin SEV likely asymptomatic stenosis. ?Symptomatic L VA MOD-SEV stenosis. L proximal ICA also w/ SEV stenosis. b/L CUS confirms R ICA w/ 60-79% stenosis, but b/l VA flow is antegrade and L ICA stenosis is MOD. Neuro exam is stable. \par  \par PLAN: \par -SHERWIN w/u\par -PT/OT \par -C/w Lipitor 40 \par -C/w ASA 81 monotherapy; will ck with Dr. Moulton if he needs more AP bc of multifocal stenosis \par -F/u w/ Dr Choi for asymptomatic L ICA stenosis and ?symptomatic L VA origin stenosis -- will reach out to JOSE CARLOS Barrientos \par -EMG for LH for atrophy \par -Nutritionist; avoid dehydration \par -F/u in 2 mo\par \par Instructed patient to call 911 or report to ED if any acute neurological changes occur, such as having severe, sudden, unusual headache or BEFAST symptoms\par

## 2023-03-20 DIAGNOSIS — I65.09 OCCLUSION AND STENOSIS OF UNSPECIFIED VERTEBRAL ARTERY: ICD-10-CM

## 2023-04-06 ENCOUNTER — APPOINTMENT (OUTPATIENT)
Dept: ELECTROPHYSIOLOGY | Facility: CLINIC | Age: 73
End: 2023-04-06
Payer: MEDICARE

## 2023-04-06 VITALS
WEIGHT: 190 LBS | HEART RATE: 44 BPM | SYSTOLIC BLOOD PRESSURE: 130 MMHG | BODY MASS INDEX: 26.5 KG/M2 | DIASTOLIC BLOOD PRESSURE: 60 MMHG

## 2023-04-06 DIAGNOSIS — Z78.9 OTHER SPECIFIED HEALTH STATUS: ICD-10-CM

## 2023-04-06 DIAGNOSIS — Z82.49 FAMILY HISTORY OF ISCHEMIC HEART DISEASE AND OTHER DISEASES OF THE CIRCULATORY SYSTEM: ICD-10-CM

## 2023-04-06 DIAGNOSIS — Z80.1 FAMILY HISTORY OF MALIGNANT NEOPLASM OF TRACHEA, BRONCHUS AND LUNG: ICD-10-CM

## 2023-04-06 DIAGNOSIS — Z48.89 ENCOUNTER FOR OTHER SPECIFIED SURGICAL AFTERCARE: ICD-10-CM

## 2023-04-06 DIAGNOSIS — Z87.891 PERSONAL HISTORY OF NICOTINE DEPENDENCE: ICD-10-CM

## 2023-04-06 PROCEDURE — 99214 OFFICE O/P EST MOD 30 MIN: CPT | Mod: 25

## 2023-04-06 PROCEDURE — 93000 ELECTROCARDIOGRAM COMPLETE: CPT

## 2023-04-12 ENCOUNTER — NON-APPOINTMENT (OUTPATIENT)
Age: 73
End: 2023-04-12

## 2023-04-13 ENCOUNTER — APPOINTMENT (OUTPATIENT)
Dept: CARDIOLOGY | Facility: CLINIC | Age: 73
End: 2023-04-13

## 2023-04-20 ENCOUNTER — APPOINTMENT (OUTPATIENT)
Dept: PULMONOLOGY | Facility: CLINIC | Age: 73
End: 2023-04-20
Payer: MEDICARE

## 2023-04-20 VITALS
WEIGHT: 170 LBS | BODY MASS INDEX: 23.8 KG/M2 | HEIGHT: 71 IN | HEART RATE: 72 BPM | OXYGEN SATURATION: 97 % | DIASTOLIC BLOOD PRESSURE: 70 MMHG | RESPIRATION RATE: 14 BRPM | SYSTOLIC BLOOD PRESSURE: 152 MMHG

## 2023-04-20 DIAGNOSIS — G47.33 OBSTRUCTIVE SLEEP APNEA (ADULT) (PEDIATRIC): ICD-10-CM

## 2023-04-20 DIAGNOSIS — J43.2 CENTRILOBULAR EMPHYSEMA: ICD-10-CM

## 2023-04-20 PROCEDURE — 99204 OFFICE O/P NEW MOD 45 MIN: CPT | Mod: 25,GC

## 2023-04-20 NOTE — PHYSICAL EXAM
[No Acute Distress] : no acute distress [Normal Oropharynx] : normal oropharynx [Normal Appearance] : normal appearance [No Neck Mass] : no neck mass [Normal Rate/Rhythm] : normal rate/rhythm [Normal S1, S2] : normal s1, s2 [No Resp Distress] : no resp distress [Clear to Auscultation Bilaterally] : clear to auscultation bilaterally [No Abnormalities] : no abnormalities [Benign] : benign [Normal Gait] : normal gait [No Clubbing] : no clubbing [No Cyanosis] : no cyanosis [No Edema] : no edema [FROM] : FROM [Normal Color/ Pigmentation] : normal color/ pigmentation [No Focal Deficits] : no focal deficits [Oriented x3] : oriented x3 [Normal Affect] : normal affect [TextBox_54] : Systolic murmur present

## 2023-04-20 NOTE — HISTORY OF PRESENT ILLNESS
[Former] : former [>= 20 pack years] : >= 20 pack years [TextBox_4] : Mr. HENAO is a 73 year man with a medical history significant for prior ~35PY smoking history, and atrial fibrillation presenting today to the clinic for evaluation for sleep apnea. \par \par \par Patient reports that for the past several months he has been having intermittent shortness of breath which does not appear to have any clear inciting factors.  He reports that for even longer than this his exercise capacity has been limited by shortness of breath as well.  He reports that this has been getting worse over time.  He says that when he brought this up with his primary care physician he was prescribed albuterol inhaler, which does effectively relieve his symptoms temporarily.  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 [TextBox_13] : 33 [YearQuit] : 2003 [ESS] : 7 [TextBox_11] : 3

## 2023-04-20 NOTE — PROCEDURE
[FreeTextEntry1] : In-office spirometry performed, flow-volume loops demonstrate small airways obstruction.  Spirometry demonstrates moderately severe obstruction, with combined restrictive defect.

## 2023-04-30 NOTE — H&P ADULT - NSHPPOAPULMEMBOLUS_GEN_A_CORE
EMERGENCY DEPARTMENT ENCOUNTER    Room Number:    Date seen:  2023  Time seen: 16:20 EDT  PCP: Hansel Valerio MD  Historian: patient    HPI:  Chief complaint:pregnant, n/v  A complete HPI/ROS/PMH/PSH/SH/FH are unobtainable due to: n/a  Context:Alison Kaiser is a 20 y.o. female  currently 13 weeks pregnant who presents to the ED with c/o persistent n/v that comes about every 48 hours and for the past day she has been unable to keep anything down.  She has tried home dose of zofran without improvement.  She DENIES vaginal bleeding or cramping.  She has h/o hyperemesis with prior pregnancies.  She does use marijuana and denies any h/o hyperemesis cannabinoid syndrome.  She see's Dr. Raymundo Farnsworth as OB/GYN and has appointment for tomorrow. States she has had  Ultrasound with this pregnancy and was single pregnancy.       Social determinants of health which may impact assessment: n/a    Review of prior external notes (non-ED):n/a    Review of prior external test results outside of this encounter: n/a    ALLERGIES  Penicillins    PAST MEDICAL HISTORY  Active Ambulatory Problems     Diagnosis Date Noted   • No Active Ambulatory Problems     Resolved Ambulatory Problems     Diagnosis Date Noted   • No Resolved Ambulatory Problems     Past Medical History:   Diagnosis Date   • Hyperemesis gravidarum        PAST SURGICAL HISTORY  History reviewed. No pertinent surgical history.    FAMILY HISTORY  History reviewed. No pertinent family history.    SOCIAL HISTORY  Social History     Socioeconomic History   • Marital status:    Tobacco Use   • Smoking status: Never   • Smokeless tobacco: Never   Vaping Use   • Vaping Use: Every day   • Substances: Nicotine   • Devices: Disposable   Substance and Sexual Activity   • Alcohol use: Never   • Drug use: Yes     Frequency: 6.0 times per week     Types: Marijuana   • Sexual activity: Defer       REVIEW OF SYSTEMS  Review of Systems    All systems  reviewed and negative except for those discussed in HPI.     PHYSICAL EXAM    I have reviewed the triage vital signs and nursing notes.  Vitals:    04/30/23 1612   BP: 90/60   Pulse: 98   Resp: 18   Temp: 98.4 °F (36.9 °C)   SpO2: 98%     Physical Exam    GENERAL: not distressed  HENT: nares patent, mm moist  EYES: no scleral icterus  NECK: no ROM limitations  CV: regular rhythm, regular rate  RESPIRATORY: normal effort  ABDOMEN: soft, non-tender  : deferred  MUSCULOSKELETAL: no deformity  NEURO: alert, moves all extremities, follows commands  SKIN: warm, dry    LAB RESULTS  Recent Results (from the past 24 hour(s))   Basic Metabolic Panel    Collection Time: 04/30/23  5:18 PM    Specimen: Blood   Result Value Ref Range    Glucose 92 65 - 99 mg/dL    BUN 5 (L) 6 - 20 mg/dL    Creatinine 0.47 (L) 0.57 - 1.00 mg/dL    Sodium 134 (L) 136 - 145 mmol/L    Potassium 3.7 3.5 - 5.2 mmol/L    Chloride 103 98 - 107 mmol/L    CO2 22.0 22.0 - 29.0 mmol/L    Calcium 9.0 8.6 - 10.5 mg/dL    BUN/Creatinine Ratio 10.6 7.0 - 25.0    Anion Gap 9.0 5.0 - 15.0 mmol/L    eGFR 139.1 >60.0 mL/min/1.73   hCG, Quantitative, Pregnancy    Collection Time: 04/30/23  5:18 PM    Specimen: Blood   Result Value Ref Range    HCG Quantitative 54,394.00 mIU/mL   CBC Auto Differential    Collection Time: 04/30/23  5:18 PM    Specimen: Blood   Result Value Ref Range    WBC 10.44 3.40 - 10.80 10*3/mm3    RBC 4.67 3.77 - 5.28 10*6/mm3    Hemoglobin 13.1 12.0 - 15.9 g/dL    Hematocrit 40.3 34.0 - 46.6 %    MCV 86.3 79.0 - 97.0 fL    MCH 28.1 26.6 - 33.0 pg    MCHC 32.5 31.5 - 35.7 g/dL    RDW 14.2 12.3 - 15.4 %    RDW-SD 46.0 37.0 - 54.0 fl    MPV 9.5 6.0 - 12.0 fL    Platelets 326 140 - 450 10*3/mm3    Neutrophil % 85.7 (H) 42.7 - 76.0 %    Lymphocyte % 10.2 (L) 19.6 - 45.3 %    Monocyte % 3.6 (L) 5.0 - 12.0 %    Eosinophil % 0.1 (L) 0.3 - 6.2 %    Basophil % 0.2 0.0 - 1.5 %    Immature Grans % 0.2 0.0 - 0.5 %    Neutrophils, Absolute 8.95 (H) 1.70 -  7.00 10*3/mm3    Lymphocytes, Absolute 1.06 0.70 - 3.10 10*3/mm3    Monocytes, Absolute 0.38 0.10 - 0.90 10*3/mm3    Eosinophils, Absolute 0.01 0.00 - 0.40 10*3/mm3    Basophils, Absolute 0.02 0.00 - 0.20 10*3/mm3    Immature Grans, Absolute 0.02 0.00 - 0.05 10*3/mm3       Ordered the above labs and independently interpreted results.  My findings will be discussed in the ED course or medical decision making section below      PROGRESS, DATA ANALYSIS, CONSULTS AND MEDICAL DECISION MAKING    Please note that this section constitutes my independent interpretation of clinical data including lab results, radiology, EKG's.  This constitutes my independent professional opinion regarding differential diagnosis and management of this patient.  It may include any factors such as history from outside sources, review of external records, social determinants of health, management of medications, response to those treatments, and discussions with other providers.      ED Course as of 04/30/23 1955   Sun Apr 30, 2023 1847 MDM/disp: Pt feeling better.  She has not had any n/v while here. She feels good to go home and has passed a po challenge with oral fluids.  She has no abdominal tenderness or vaginal bleeding.  Has OB appointment tomorrow and Zofran at home. [EW]      ED Course User Index  [EW] Leigh Blackburn APRN       Orders placed during this visit:  Orders Placed This Encounter   Procedures   • Urinalysis without microscopic (no culture) - Urine, Clean Catch   • Basic Metabolic Panel   • hCG, Quantitative, Pregnancy   • CBC Auto Differential   • Blood Draw With IV Start   • Insert peripheral IV   • CBC & Differential   • ED Acknowledgement Form Needed;              MDM  Pt with h/o hyperemesis and is 13 weeks pregnant.  She has OB appointment tomorrow.  Denies any vaginal bleeding or cramping. Will hydrate here, check labs and give IV zofran.        DIAGNOSIS  Final diagnoses:   Nausea and vomiting in pregnancy prior to  22 weeks gestation   Pregnancy with 13 completed weeks gestation       FOLLOW-UP  Follow up with OB/GYN in am              Latest Documented Vital Signs:  As of 19:55 EDT  BP- 90/60 HR- 98 Temp- 98.4 °F (36.9 °C) (Temporal) O2 sat- 98%    Appropriate PPE utilized throughout this patient encounter to include mask, if indicated, per current protocol. Hand hygiene was performed before donning PPE and after removal when leaving the room.    Please note that portions of this were completed with a voice recognition program.     Note Disclaimer: At [facility], we believe that sharing information builds trust and better relationships. You are receiving this note because you are receiving care at [facility] or recently visited. It is possible you will see health information before a provider has talked with you about it. This kind of information can be easy to misunderstand. To help you fully understand what it means for your health, we urge you to discuss this note with your provider.               no

## 2023-05-04 PROBLEM — Z48.89 ENCOUNTER FOR POSTOPERATIVE WOUND CHECK: Status: ACTIVE | Noted: 2023-04-06

## 2023-05-04 NOTE — PROCEDURE
[See Device Printout] : See device printout [de-identified] : SADE [de-identified] : LINQ II [de-identified] : [id number] [de-identified] : 1/30/2023 [de-identified] : 1 AF event 12 hours 38 minutes seen on remote max V 140 bpm

## 2023-05-04 NOTE — HISTORY OF PRESENT ILLNESS
[de-identified] : Cardio: Dr. Ortiz\par EP: Dr. Cobos\par \par 73 years old male with PMH of HTN, HLD presented to Ed with sudden onset of loss of vision. Admitted with acute CVA.\par He underwent placement of ILR for occult AF monitoring. AF was found on remote transmission and Eliquis was initiated, however patient has not been taking it due to cost. He also reports some leg cramping that he thinks may be due to amlodipine so he stopped that.

## 2023-05-04 NOTE — CARDIOLOGY SUMMARY
[de-identified] : 4/6/2023: sinus arrhythmia 44 bpm [de-identified] : < from: TTE Echo Complete w/o Contrast w/ Doppler (01.27.23 @ 13:16) >\par Summary:\par  1. Normal global left ventricular systolic function.\par  2. LV Ejection Fraction by Fan's Method with a biplane EF of 65 %.\par  3. Mildly enlarged right ventricle. RV systolic function is normal.\par  4. Severe mitral annular calcification.\par  5. Moderate aortic valve stenosis with peak AV velocity 3.46 m/s, RACH \par 1.18 cm^2, and MG 25.0 mmHg.\par  6. Mild aortic regurgitation.\par  7. Mild tricuspid regurgitation.\par  8. Estimated pulmonary artery systolic pressure is 45.4 mmHg assuming a \par right atrial pressure of 13 mmHg,which is consistent with mild pulmonary \par hypertension.\par

## 2023-05-04 NOTE — ASSESSMENT
[FreeTextEntry1] : CVA s/p ILR implant\par - Wound is well healed. There are no signs or symptoms of infection of inflammation. There is no redness, no swelling, no erythema. The patient has no pain. \par - Device interrogation normal. I interrogated and reprogrammed the device as described above. \par - Patient is enrolled in remote monitoring services. I reviewed remote transmission process with the patient, as well as schedule and ability to do manual transmission. We also spoke about associated co-payments with remote monitoring that may not be covered by insurance. \par \par PAF  Cha2 DS2 Vasc score 4 (age, HTN, CVA) HAS-BLED 2 (age, CVA)\par - Eliquis samples provided in office with patient assistance form\par - Reinforced importance of starting Eliquis and compliance. \par Patient was educated on modifiable bleeding risk factors such as correct dosage, frequency, adherence, to avoid concomitant use of over-the -counter nonsteroidal anti-inflammatory medications, excessive alcohol intake, and compliance with antihypertensive meds to control blood pressure. Potential complications associated with OAC such as bleeding , signs and symptoms, when to call office and when to seek immediate medical attention/ ER visit discussed in great details. Fall prevention, to avoid hazardous activities discussed. \par \par RTO in 4-6 weeks to reassess on OAC\par

## 2023-05-04 NOTE — PHYSICAL EXAM
[General Appearance - Well Developed] : well developed [Normal Appearance] : normal appearance [Well Groomed] : well groomed [General Appearance - Well Nourished] : well nourished [No Deformities] : no deformities [General Appearance - In No Acute Distress] : no acute distress [III] : a grade 3 [] : no respiratory distress [Respiration, Rhythm And Depth] : normal respiratory rhythm and effort [Exaggerated Use Of Accessory Muscles For Inspiration] : no accessory muscle use [Abdomen Soft] : soft [Nail Clubbing] : no clubbing of the fingernails [Cyanosis, Localized] : no localized cyanosis [Clean] : clean [Dry] : dry [Well-Healed] : well-healed [Heart Rate And Rhythm] : heart rate and rhythm were normal [Heart Sounds] : normal S1 and S2 [FreeTextEntry1] : Parasternal

## 2023-05-11 ENCOUNTER — APPOINTMENT (OUTPATIENT)
Dept: ELECTROPHYSIOLOGY | Facility: CLINIC | Age: 73
End: 2023-05-11
Payer: MEDICARE

## 2023-05-11 VITALS
DIASTOLIC BLOOD PRESSURE: 78 MMHG | HEART RATE: 62 BPM | BODY MASS INDEX: 23.8 KG/M2 | WEIGHT: 170 LBS | SYSTOLIC BLOOD PRESSURE: 141 MMHG | TEMPERATURE: 98 F | HEIGHT: 71 IN

## 2023-05-11 DIAGNOSIS — I48.91 UNSPECIFIED ATRIAL FIBRILLATION: ICD-10-CM

## 2023-05-11 DIAGNOSIS — I49.3 VENTRICULAR PREMATURE DEPOLARIZATION: ICD-10-CM

## 2023-05-11 PROCEDURE — 93000 ELECTROCARDIOGRAM COMPLETE: CPT

## 2023-05-11 PROCEDURE — 99215 OFFICE O/P EST HI 40 MIN: CPT | Mod: 25

## 2023-05-11 NOTE — HISTORY OF PRESENT ILLNESS
[de-identified] : Cardio: Dr. Ortiz\par EP: Dr. Cobos\par \par 73 years old male with PMH of HTN, HLD presented to Ed with sudden onset of loss of vision. Admitted with acute CVA.\par He underwent placement of ILR for occult AF monitoring. AF was found on remote transmission and Eliquis was initiated, however patient is telling me he is only taking it once daily. Patient lives alone and is reporting difficulty with ADLs, confusion and visual hallucinations. It is questionable whether patient is compliant with meds. He can carry a conversation and memory intact, however he is unsure about a lot of his medical issues. \par \par Today, he has no cardiac complaints.

## 2023-05-11 NOTE — CARDIOLOGY SUMMARY
[de-identified] : 5/11/2023: sinus rhythm 62 bpm #1 (see PVC morphology EKG #2)\par 4/6/2023: sinus arrhythmia 44 bpm [de-identified] : < from: TTE Echo Complete w/o Contrast w/ Doppler (01.27.23 @ 13:16) >\par Summary:\par  1. Normal global left ventricular systolic function.\par  2. LV Ejection Fraction by Fan's Method with a biplane EF of 65 %.\par  3. Mildly enlarged right ventricle. RV systolic function is normal.\par  4. Severe mitral annular calcification.\par  5. Moderate aortic valve stenosis with peak AV velocity 3.46 m/s, RACH \par 1.18 cm^2, and MG 25.0 mmHg.\par  6. Mild aortic regurgitation.\par  7. Mild tricuspid regurgitation.\par  8. Estimated pulmonary artery systolic pressure is 45.4 mmHg assuming a \par right atrial pressure of 13 mmHg,which is consistent with mild pulmonary \par hypertension.\par
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
## 2023-05-11 NOTE — PHYSICAL EXAM
[General Appearance - Well Developed] : well developed [Normal Appearance] : normal appearance [Well Groomed] : well groomed [General Appearance - Well Nourished] : well nourished [No Deformities] : no deformities [General Appearance - In No Acute Distress] : no acute distress [Heart Rate And Rhythm] : heart rate and rhythm were normal [Heart Sounds] : normal S1 and S2 [] : no respiratory distress [Respiration, Rhythm And Depth] : normal respiratory rhythm and effort [Exaggerated Use Of Accessory Muscles For Inspiration] : no accessory muscle use [Clean] : clean [Dry] : dry [Well-Healed] : well-healed [Abdomen Soft] : soft [Nail Clubbing] : no clubbing of the fingernails [Cyanosis, Localized] : no localized cyanosis [FreeTextEntry1] : Parasternal

## 2023-05-11 NOTE — PROCEDURE
[See Device Printout] : See device printout [de-identified] : SADE [de-identified] : LINQ II [de-identified] : [id number] [de-identified] : 1/30/2023 [de-identified] : No events\par PVC burden 5%
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
## 2023-05-11 NOTE — PROCEDURE
[See Device Printout] : See device printout [de-identified] : SADE [de-identified] : LINQ II [de-identified] : [id number] [de-identified] : 1/30/2023 [de-identified] : No events\par PVC burden 5%

## 2023-05-11 NOTE — HISTORY OF PRESENT ILLNESS
[de-identified] : Cardio: Dr. Ortiz\par EP: Dr. Cobos\par \par 73 years old male with PMH of HTN, HLD presented to Ed with sudden onset of loss of vision. Admitted with acute CVA.\par He underwent placement of ILR for occult AF monitoring. AF was found on remote transmission and Eliquis was initiated, however patient is telling me he is only taking it once daily. Patient lives alone and is reporting difficulty with ADLs, confusion and visual hallucinations. It is questionable whether patient is compliant with meds. He can carry a conversation and memory intact, however he is unsure about a lot of his medical issues. \par \par Today, he has no cardiac complaints.

## 2023-05-11 NOTE — CARDIOLOGY SUMMARY
[de-identified] : 5/11/2023: sinus rhythm 62 bpm #1 (see PVC morphology EKG #2)\par 4/6/2023: sinus arrhythmia 44 bpm [de-identified] : < from: TTE Echo Complete w/o Contrast w/ Doppler (01.27.23 @ 13:16) >\par Summary:\par  1. Normal global left ventricular systolic function.\par  2. LV Ejection Fraction by Fan's Method with a biplane EF of 65 %.\par  3. Mildly enlarged right ventricle. RV systolic function is normal.\par  4. Severe mitral annular calcification.\par  5. Moderate aortic valve stenosis with peak AV velocity 3.46 m/s, RACH \par 1.18 cm^2, and MG 25.0 mmHg.\par  6. Mild aortic regurgitation.\par  7. Mild tricuspid regurgitation.\par  8. Estimated pulmonary artery systolic pressure is 45.4 mmHg assuming a \par right atrial pressure of 13 mmHg,which is consistent with mild pulmonary \par hypertension.\par

## 2023-05-15 ENCOUNTER — APPOINTMENT (OUTPATIENT)
Dept: NEUROLOGY | Facility: CLINIC | Age: 73
End: 2023-05-15

## 2023-05-25 ENCOUNTER — NON-APPOINTMENT (OUTPATIENT)
Age: 73
End: 2023-05-25

## 2023-06-12 ENCOUNTER — APPOINTMENT (OUTPATIENT)
Dept: CARDIOLOGY | Facility: CLINIC | Age: 73
End: 2023-06-12
Payer: MEDICARE

## 2023-06-12 ENCOUNTER — NON-APPOINTMENT (OUTPATIENT)
Age: 73
End: 2023-06-12

## 2023-06-12 PROCEDURE — G2066: CPT

## 2023-06-12 PROCEDURE — 93298 REM INTERROG DEV EVAL SCRMS: CPT

## 2023-07-17 ENCOUNTER — NON-APPOINTMENT (OUTPATIENT)
Age: 73
End: 2023-07-17

## 2023-07-17 ENCOUNTER — APPOINTMENT (OUTPATIENT)
Dept: CARDIOLOGY | Facility: CLINIC | Age: 73
End: 2023-07-17
Payer: MEDICARE

## 2023-07-17 PROCEDURE — 93298 REM INTERROG DEV EVAL SCRMS: CPT

## 2023-07-17 PROCEDURE — G2066: CPT

## 2023-08-10 ENCOUNTER — APPOINTMENT (OUTPATIENT)
Dept: PULMONOLOGY | Facility: CLINIC | Age: 73
End: 2023-08-10

## 2023-08-30 ENCOUNTER — APPOINTMENT (OUTPATIENT)
Dept: ELECTROPHYSIOLOGY | Facility: CLINIC | Age: 73
End: 2023-08-30
Payer: MEDICARE

## 2023-08-30 VITALS
HEART RATE: 63 BPM | WEIGHT: 170 LBS | TEMPERATURE: 98 F | SYSTOLIC BLOOD PRESSURE: 125 MMHG | BODY MASS INDEX: 23.8 KG/M2 | HEIGHT: 71 IN | DIASTOLIC BLOOD PRESSURE: 67 MMHG

## 2023-08-30 DIAGNOSIS — R00.1 BRADYCARDIA, UNSPECIFIED: ICD-10-CM

## 2023-08-30 DIAGNOSIS — I63.9 CEREBRAL INFARCTION, UNSPECIFIED: ICD-10-CM

## 2023-08-30 PROCEDURE — 93291 INTERROG DEV EVAL SCRMS IP: CPT

## 2023-08-30 RX ORDER — METOPROLOL SUCCINATE 25 MG/1
25 TABLET, EXTENDED RELEASE ORAL DAILY
Qty: 90 | Refills: 3 | Status: DISCONTINUED | COMMUNITY
Start: 2023-05-11 | End: 2023-08-30

## 2023-08-30 RX ORDER — UMECLIDINIUM BROMIDE AND VILANTEROL TRIFENATATE 62.5; 25 UG/1; UG/1
62.5-25 POWDER RESPIRATORY (INHALATION)
Qty: 4 | Refills: 2 | Status: ACTIVE | COMMUNITY
Start: 2023-04-20

## 2023-08-30 RX ORDER — ATORVASTATIN CALCIUM 40 MG/1
40 TABLET, FILM COATED ORAL
Qty: 30 | Refills: 2 | Status: ACTIVE | COMMUNITY

## 2023-09-01 PROBLEM — R00.1 BRADYCARDIA: Status: ACTIVE | Noted: 2023-09-01

## 2023-09-01 PROBLEM — I63.9 STROKE: Status: ACTIVE | Noted: 2023-02-08

## 2023-09-01 RX ORDER — AMLODIPINE BESYLATE 10 MG/1
10 TABLET ORAL DAILY
Refills: 0 | Status: ACTIVE | COMMUNITY

## 2023-09-01 NOTE — PROCEDURE
[No] : not [NSR] : normal sinus rhythm [See Device Printout] : See device printout [Longevity: ___ months] : The estimated remaining battery life is [unfilled] months [None] : none [Programmed for Longevity] : output reprogrammed for improved battery longevity [Sensing Amplitude ___mv] : sensing amplitude was [unfilled] mv [de-identified] : 70bpm [de-identified] : SADE [de-identified] : LINQ II [de-identified] : [id number] [de-identified] : 1/30/2023 [de-identified] : Multiple episodes of bradycardia @ 38bpm during the day- patient not sure if he had dozed off. PVC burden 5%

## 2023-09-01 NOTE — HISTORY OF PRESENT ILLNESS
[de-identified] : Cardio: Dr. Ortiz EP: Dr. Cobos  73 years old male with PMH of HTN, HLD presented to Ed with sudden onset of loss of vision. Admitted with acute CVA. He underwent placement of ILR for occult AF monitoring. AF was found on remote transmission and Eliquis was initiated,. Patient lives alone , he said he is able to take care of himself , however he said sometimes he gets confused and sometimes have visual hallucinations. It is questionable whether patient is compliant with meds but emphasized he takes eliquis twice a day and he gets it free.  He can carry a conversation and memory intact, however he is unsure about a lot of his medical issues.   Today, he report some shortness of breath.

## 2023-09-01 NOTE — ASSESSMENT
[FreeTextEntry1] : CVA s/p ILR implant - Device interrogation normal. I interrogated and reprogrammed the device as described above.  - He is on remote and transmitting - Numerous obdulia episodes @ 38 bpm @ 1 pm to 5 pm in the afternoon.  Patient claimed he is usually watching TV and not sure if he dozed off. He doesn't eat and drink well. He said he drinks 1 bottle a day. sometimes his friend would bring him ensure or juice to drink.    PAF  Cha2 DS2 Vasc score 4 (age, HTN, CVA) HAS-BLED 2 (age, CVA) He report he is compliant with medication. He report he gets it for free now. - No new events  # SOB - he report no energy as well and has some lightheadedness when changing position.. His echo in January showed normal LVEF. He has a cancelled appointment with Dr. Ortiz which I encouraged him to have a follow up. - I will stop metoprolol 25mg daily for now.  RTO in 6 months. to see any improvement with his HR<

## 2023-09-01 NOTE — CARDIOLOGY SUMMARY
[de-identified] : 5/11/2023: sinus rhythm 62 bpm #1 (see PVC morphology EKG #2)\par  4/6/2023: sinus arrhythmia 44 bpm [de-identified] : < from: TTE Echo Complete w/o Contrast w/ Doppler (01.27.23 @ 13:16) >\par  Summary:\par   1. Normal global left ventricular systolic function.\par   2. LV Ejection Fraction by Fan's Method with a biplane EF of 65 %.\par   3. Mildly enlarged right ventricle. RV systolic function is normal.\par   4. Severe mitral annular calcification.\par   5. Moderate aortic valve stenosis with peak AV velocity 3.46 m/s, RACH \par  1.18 cm^2, and MG 25.0 mmHg.\par   6. Mild aortic regurgitation.\par   7. Mild tricuspid regurgitation.\par   8. Estimated pulmonary artery systolic pressure is 45.4 mmHg assuming a \par  right atrial pressure of 13 mmHg,which is consistent with mild pulmonary \par  hypertension.\par   [de-identified] : [facility]/2023 [facility]

## 2023-09-14 ENCOUNTER — APPOINTMENT (OUTPATIENT)
Dept: NEUROLOGY | Facility: CLINIC | Age: 73
End: 2023-09-14

## 2023-09-19 ENCOUNTER — APPOINTMENT (OUTPATIENT)
Dept: PULMONOLOGY | Facility: CLINIC | Age: 73
End: 2023-09-19

## 2023-10-04 ENCOUNTER — NON-APPOINTMENT (OUTPATIENT)
Age: 73
End: 2023-10-04

## 2023-10-04 ENCOUNTER — APPOINTMENT (OUTPATIENT)
Dept: CARDIOLOGY | Facility: CLINIC | Age: 73
End: 2023-10-04
Payer: MEDICARE

## 2023-10-05 PROCEDURE — G2066: CPT

## 2023-10-05 PROCEDURE — 93298 REM INTERROG DEV EVAL SCRMS: CPT

## 2023-10-16 ENCOUNTER — APPOINTMENT (OUTPATIENT)
Dept: PULMONOLOGY | Facility: CLINIC | Age: 73
End: 2023-10-16
Payer: MEDICARE

## 2023-10-16 PROCEDURE — 94010 BREATHING CAPACITY TEST: CPT

## 2023-10-16 PROCEDURE — 94727 GAS DIL/WSHOT DETER LNG VOL: CPT

## 2023-10-16 PROCEDURE — 94729 DIFFUSING CAPACITY: CPT

## 2023-10-19 NOTE — ASSESSMENT
Noted   [FreeTextEntry1] : Patient is 74 yo RH man with PMHx of HTN, HLD, ?AAA, who presents as ED f/u from 1/26/23 for acute R PCA territory infarct in occipital lobe w/ Trace petechial hemorrhage in setting of SEV stenosis/occlusion of distal R PCA (?thromboembolic vs cardioembolic); a/w L VA origin mod-sev stenosis. Initial /71.  \par \par PLAN:\par -CTH to r/o stroke/bleed given recent HT\par -b/l CUS \par -C/w Lipitor 40 and repeat lipid panel 2/16\par -C/w DAPT until Plavix is over, then ASA 81 monotherapy \par -F/u w/ Dr Choi for asymptomatic L ICA stenosis and ?symptomatic L VA origin stenosis\par -Recommended Driving Eval before driving \par -LH PT and EMG of LH for atrophy \par -F/u in 6 weeks -- do miniCog, can consider 24hr aEEG, Sleep study?

## 2023-11-08 ENCOUNTER — NON-APPOINTMENT (OUTPATIENT)
Age: 73
End: 2023-11-08

## 2023-11-08 ENCOUNTER — APPOINTMENT (OUTPATIENT)
Dept: CARDIOLOGY | Facility: CLINIC | Age: 73
End: 2023-11-08
Payer: MEDICARE

## 2023-11-09 PROCEDURE — G2066: CPT

## 2023-11-09 PROCEDURE — 93298 REM INTERROG DEV EVAL SCRMS: CPT

## 2023-11-10 NOTE — OCCUPATIONAL THERAPY INITIAL EVALUATION ADULT - BED MOBILITY LIMITATIONS, REHAB EVAL
decreased ability to use arms for pushing/pulling/decreased ability to use legs for bridging/pushing Solar[employer] Pregnancy And Lactation Text: This medication is Pregnancy Category B and is considered safe. There is some data to suggest avoiding during the third trimester. It is unknown if this medication is excreted in breast milk.

## 2023-12-13 ENCOUNTER — APPOINTMENT (OUTPATIENT)
Dept: CARDIOLOGY | Facility: CLINIC | Age: 73
End: 2023-12-13
Payer: MEDICARE

## 2023-12-13 ENCOUNTER — NON-APPOINTMENT (OUTPATIENT)
Age: 73
End: 2023-12-13

## 2023-12-14 PROCEDURE — G2066: CPT

## 2023-12-14 PROCEDURE — 93298 REM INTERROG DEV EVAL SCRMS: CPT

## 2023-12-15 NOTE — H&P ADULT - NSICDXNOPASTSURGICALHX_GEN_ALL_CORE
How Severe Is Your Rash?: mild
Is This A New Presentation, Or A Follow-Up?: Rash
<-- Click to add NO significant Past Surgical History

## 2023-12-28 RX ORDER — APIXABAN 5 MG/1
5 TABLET, FILM COATED ORAL
Qty: 180 | Refills: 3 | Status: ACTIVE | COMMUNITY
Start: 2023-03-31 | End: 1900-01-01

## 2024-01-17 ENCOUNTER — APPOINTMENT (OUTPATIENT)
Dept: CARDIOLOGY | Facility: CLINIC | Age: 74
End: 2024-01-17
Payer: MEDICARE

## 2024-01-17 ENCOUNTER — NON-APPOINTMENT (OUTPATIENT)
Age: 74
End: 2024-01-17

## 2024-01-18 PROCEDURE — 93298 REM INTERROG DEV EVAL SCRMS: CPT

## 2024-02-21 ENCOUNTER — NON-APPOINTMENT (OUTPATIENT)
Age: 74
End: 2024-02-21

## 2024-02-21 ENCOUNTER — APPOINTMENT (OUTPATIENT)
Dept: CARDIOLOGY | Facility: CLINIC | Age: 74
End: 2024-02-21
Payer: MEDICARE

## 2024-02-21 PROCEDURE — 93298 REM INTERROG DEV EVAL SCRMS: CPT

## 2024-03-04 ENCOUNTER — APPOINTMENT (OUTPATIENT)
Dept: ELECTROPHYSIOLOGY | Facility: CLINIC | Age: 74
End: 2024-03-04
Payer: MEDICARE

## 2024-03-04 VITALS
DIASTOLIC BLOOD PRESSURE: 62 MMHG | HEART RATE: 62 BPM | SYSTOLIC BLOOD PRESSURE: 150 MMHG | HEIGHT: 71 IN | TEMPERATURE: 98 F

## 2024-03-04 VITALS — BODY MASS INDEX: 23.99 KG/M2 | WEIGHT: 172 LBS

## 2024-03-04 DIAGNOSIS — I49.3 VENTRICULAR PREMATURE DEPOLARIZATION: ICD-10-CM

## 2024-03-04 DIAGNOSIS — Z45.09 ENCOUNTER FOR ADJUSTMENT AND MANAGEMENT OF OTHER CARDIAC DEVICE: ICD-10-CM

## 2024-03-04 DIAGNOSIS — I48.0 PAROXYSMAL ATRIAL FIBRILLATION: ICD-10-CM

## 2024-03-04 PROCEDURE — 99214 OFFICE O/P EST MOD 30 MIN: CPT

## 2024-03-04 PROCEDURE — 93291 INTERROG DEV EVAL SCRMS IP: CPT

## 2024-03-04 RX ORDER — ASPIRIN ENTERIC COATED TABLETS 81 MG 81 MG/1
81 TABLET, DELAYED RELEASE ORAL DAILY
Qty: 90 | Refills: 1 | Status: COMPLETED | COMMUNITY
Start: 2023-03-15 | End: 2024-03-04

## 2024-03-05 PROBLEM — I49.3 PVC'S (PREMATURE VENTRICULAR CONTRACTIONS): Status: ACTIVE | Noted: 2024-03-05

## 2024-03-05 RX ORDER — METOPROLOL SUCCINATE 25 MG/1
25 TABLET, EXTENDED RELEASE ORAL 3 TIMES DAILY
Refills: 0 | Status: ACTIVE | COMMUNITY

## 2024-03-05 RX ORDER — MECLIZINE HYDROCHLORIDE 25 MG/1
25 TABLET ORAL
Qty: 90 | Refills: 0 | Status: ACTIVE | COMMUNITY
Start: 2023-12-07

## 2024-03-05 NOTE — CARDIOLOGY SUMMARY
[de-identified] : 5/11/2023: sinus rhythm 62 bpm #1 (see PVC morphology EKG #2)\par  4/6/2023: sinus arrhythmia 44 bpm [de-identified] : < from: TTE Echo Complete w/o Contrast w/ Doppler (01.27.23 @ 13:16) >\par  Summary:\par   1. Normal global left ventricular systolic function.\par   2. LV Ejection Fraction by Fan's Method with a biplane EF of 65 %.\par   3. Mildly enlarged right ventricle. RV systolic function is normal.\par   4. Severe mitral annular calcification.\par   5. Moderate aortic valve stenosis with peak AV velocity 3.46 m/s, RACH \par  1.18 cm^2, and MG 25.0 mmHg.\par   6. Mild aortic regurgitation.\par   7. Mild tricuspid regurgitation.\par   8. Estimated pulmonary artery systolic pressure is 45.4 mmHg assuming a \par  right atrial pressure of 13 mmHg,which is consistent with mild pulmonary \par  hypertension.\par   [de-identified] [facility]

## 2024-03-05 NOTE — PHYSICAL EXAM
[General Appearance - Well Developed] : well developed [Normal Appearance] : normal appearance [Well Groomed] : well groomed [No Deformities] : no deformities [General Appearance - Well Nourished] : well nourished [General Appearance - In No Acute Distress] : no acute distress [Heart Rate And Rhythm] : heart rate and rhythm were normal [] : no respiratory distress [Heart Sounds] : normal S1 and S2 [Respiration, Rhythm And Depth] : normal respiratory rhythm and effort [Exaggerated Use Of Accessory Muscles For Inspiration] : no accessory muscle use [Clean] : clean [Dry] : dry [Well-Healed] : well-healed [Abdomen Soft] : soft [Cyanosis, Localized] : no localized cyanosis [Nail Clubbing] : no clubbing of the fingernails [FreeTextEntry1] : Parasternal

## 2024-03-05 NOTE — HISTORY OF PRESENT ILLNESS
[de-identified] : Cardio: Dr. Ortiz EP: Dr. Cobos  73 years old male with PMH of HTN, HLD presented to Ed with sudden onset of loss of vision. Admitted with acute CVA. He underwent placement of ILR for occult AF monitoring. AF was found on remote transmission and Eliquis was initiated,. Patient lives alone , he said he is able to take care of himself , however he said sometimes he gets confused and sometimes have visual hallucinations. It is questionable whether patient is compliant with meds but emphasized he takes eliquis twice a day and he gets it free.  He can carry a conversation and memory intact, however he is unsure about a lot of his medical issues.   Today, he report some shortness of breath.

## 2024-03-05 NOTE — ASSESSMENT
[FreeTextEntry1] : Loop implant for cryptogenic stroke Currently in Sinus with PVC's during interrogation - Device interrogation normal.  - He is on remote and transmitting - No obdulia episodes     PAF  Cha2 DS2 Vasc score 4 (age, HTN, CVA) HAS-BLED 2 (age, CVA) Continue Eliquis - no bleeding issue He report he is compliant with medication.  - No new events  # PVC burden 3% - Restarted metoprolol succ 25mg daily to suppress and also to control his BP which is elevated during his visit  RTO in 12 months and prn Follow up with Dr. Ortiz

## 2024-03-05 NOTE — PROCEDURE
[No] : not [NSR] : normal sinus rhythm [See Device Printout] : See device printout [Longevity: ___ months] : The estimated remaining battery life is [unfilled] months [Sensing Amplitude ___mv] : sensing amplitude was [unfilled] mv [None] : none [Programmed for Longevity] : output reprogrammed for improved battery longevity [de-identified] : 70bpm [de-identified] : SADE [de-identified] : LINQ II [de-identified] : [id number] [de-identified] : 1/30/2023 [de-identified] : Multiple episodes of bradycardia @ 38bpm during the day- patient not sure if he had dozed off. PVC burden 5%

## 2024-04-05 ENCOUNTER — APPOINTMENT (OUTPATIENT)
Dept: CARDIOLOGY | Facility: CLINIC | Age: 74
End: 2024-04-05
Payer: MEDICARE

## 2024-04-05 ENCOUNTER — NON-APPOINTMENT (OUTPATIENT)
Age: 74
End: 2024-04-05

## 2024-04-05 PROCEDURE — 93298 REM INTERROG DEV EVAL SCRMS: CPT

## 2024-04-18 NOTE — END OF VISIT
[Time Spent: ___ minutes] : I have spent [unfilled] minutes of time on the encounter.
Breath sounds clear and equal bilaterally.

## 2024-05-10 ENCOUNTER — NON-APPOINTMENT (OUTPATIENT)
Age: 74
End: 2024-05-10

## 2024-05-10 ENCOUNTER — APPOINTMENT (OUTPATIENT)
Dept: CARDIOLOGY | Facility: CLINIC | Age: 74
End: 2024-05-10
Payer: MEDICARE

## 2024-05-10 PROCEDURE — 93298 REM INTERROG DEV EVAL SCRMS: CPT

## 2024-06-09 NOTE — PROGRESS NOTE ADULT - NS ATTEST RISK PROBLEM GEN_ALL_CORE FT
acute stroke, PVCs, tele, neuro checks, COPD, CAD, thrombocytopenia
acute PCA CVA, nihss 1, on DAPT, telemonitoring, pending KEILA and possible loop    stable neurologically
agree with above plan of care
acute stroke, PVCs, tele, neuro checks, COPD, CAD, thrombocytopenia
[id number]

## 2024-06-14 ENCOUNTER — NON-APPOINTMENT (OUTPATIENT)
Age: 74
End: 2024-06-14

## 2024-06-14 ENCOUNTER — APPOINTMENT (OUTPATIENT)
Dept: CARDIOLOGY | Facility: CLINIC | Age: 74
End: 2024-06-14
Payer: MEDICARE

## 2024-06-14 PROCEDURE — 93298 REM INTERROG DEV EVAL SCRMS: CPT

## 2024-07-19 ENCOUNTER — APPOINTMENT (OUTPATIENT)
Dept: CARDIOLOGY | Facility: CLINIC | Age: 74
End: 2024-07-19
Payer: MEDICARE

## 2024-07-19 ENCOUNTER — NON-APPOINTMENT (OUTPATIENT)
Age: 74
End: 2024-07-19

## 2024-07-19 PROCEDURE — 93298 REM INTERROG DEV EVAL SCRMS: CPT

## 2024-08-22 ENCOUNTER — APPOINTMENT (OUTPATIENT)
Dept: CARDIOLOGY | Facility: CLINIC | Age: 74
End: 2024-08-22
Payer: MEDICARE

## 2024-08-22 ENCOUNTER — NON-APPOINTMENT (OUTPATIENT)
Age: 74
End: 2024-08-22

## 2024-08-22 PROCEDURE — 93298 REM INTERROG DEV EVAL SCRMS: CPT

## 2024-09-26 ENCOUNTER — NON-APPOINTMENT (OUTPATIENT)
Age: 74
End: 2024-09-26

## 2024-09-26 ENCOUNTER — APPOINTMENT (OUTPATIENT)
Dept: CARDIOLOGY | Facility: CLINIC | Age: 74
End: 2024-09-26
Payer: MEDICARE

## 2024-09-26 PROCEDURE — 93298 REM INTERROG DEV EVAL SCRMS: CPT

## 2024-10-30 ENCOUNTER — APPOINTMENT (OUTPATIENT)
Dept: CARDIOLOGY | Facility: CLINIC | Age: 74
End: 2024-10-30
Payer: MEDICARE

## 2024-10-30 ENCOUNTER — NON-APPOINTMENT (OUTPATIENT)
Age: 74
End: 2024-10-30

## 2024-10-30 PROCEDURE — 93298 REM INTERROG DEV EVAL SCRMS: CPT

## 2024-11-04 ENCOUNTER — APPOINTMENT (OUTPATIENT)
Dept: PULMONOLOGY | Facility: CLINIC | Age: 74
End: 2024-11-04
Payer: MEDICARE

## 2024-11-04 VITALS
HEART RATE: 71 BPM | OXYGEN SATURATION: 97 % | HEIGHT: 71 IN | BODY MASS INDEX: 22.4 KG/M2 | WEIGHT: 160 LBS | DIASTOLIC BLOOD PRESSURE: 70 MMHG | SYSTOLIC BLOOD PRESSURE: 122 MMHG | RESPIRATION RATE: 14 BRPM

## 2024-11-04 DIAGNOSIS — R91.8 OTHER NONSPECIFIC ABNORMAL FINDING OF LUNG FIELD: ICD-10-CM

## 2024-11-04 DIAGNOSIS — J43.2 CENTRILOBULAR EMPHYSEMA: ICD-10-CM

## 2024-11-04 DIAGNOSIS — G47.33 OBSTRUCTIVE SLEEP APNEA (ADULT) (PEDIATRIC): ICD-10-CM

## 2024-11-04 PROCEDURE — G2211 COMPLEX E/M VISIT ADD ON: CPT

## 2024-11-04 PROCEDURE — 99214 OFFICE O/P EST MOD 30 MIN: CPT

## 2024-11-20 ENCOUNTER — APPOINTMENT (OUTPATIENT)
Dept: ORTHOPEDIC SURGERY | Facility: CLINIC | Age: 74
End: 2024-11-20
Payer: MEDICARE

## 2024-11-20 VITALS — HEIGHT: 71 IN | BODY MASS INDEX: 23.1 KG/M2 | WEIGHT: 165 LBS

## 2024-11-20 DIAGNOSIS — I51.9 HEART DISEASE, UNSPECIFIED: ICD-10-CM

## 2024-11-20 DIAGNOSIS — M53.3 SACROCOCCYGEAL DISORDERS, NOT ELSEWHERE CLASSIFIED: ICD-10-CM

## 2024-11-20 DIAGNOSIS — M76.02 GLUTEAL TENDINITIS, LEFT HIP: ICD-10-CM

## 2024-11-20 DIAGNOSIS — I63.9 CEREBRAL INFARCTION, UNSPECIFIED: ICD-10-CM

## 2024-11-20 DIAGNOSIS — J44.9 CHRONIC OBSTRUCTIVE PULMONARY DISEASE, UNSPECIFIED: ICD-10-CM

## 2024-11-20 PROCEDURE — 99203 OFFICE O/P NEW LOW 30 MIN: CPT

## 2024-11-20 PROCEDURE — 72170 X-RAY EXAM OF PELVIS: CPT

## 2024-11-21 PROBLEM — I63.9 STROKE: Status: RESOLVED | Noted: 2024-11-21 | Resolved: 2024-11-21

## 2024-11-21 PROBLEM — J44.9 COPD (CHRONIC OBSTRUCTIVE PULMONARY DISEASE): Status: RESOLVED | Noted: 2024-11-21 | Resolved: 2024-11-21

## 2024-11-21 PROBLEM — I51.9 HEART DISEASE: Status: RESOLVED | Noted: 2024-11-21 | Resolved: 2024-11-21

## 2024-12-04 ENCOUNTER — APPOINTMENT (OUTPATIENT)
Dept: CARDIOLOGY | Facility: CLINIC | Age: 74
End: 2024-12-04
Payer: MEDICARE

## 2024-12-04 ENCOUNTER — NON-APPOINTMENT (OUTPATIENT)
Age: 74
End: 2024-12-04

## 2024-12-04 PROCEDURE — 93298 REM INTERROG DEV EVAL SCRMS: CPT

## 2025-01-08 ENCOUNTER — APPOINTMENT (OUTPATIENT)
Dept: CARDIOLOGY | Facility: CLINIC | Age: 75
End: 2025-01-08
Payer: SELF-PAY

## 2025-01-08 ENCOUNTER — NON-APPOINTMENT (OUTPATIENT)
Age: 75
End: 2025-01-08

## 2025-01-08 PROCEDURE — 93298 REM INTERROG DEV EVAL SCRMS: CPT

## 2025-02-11 ENCOUNTER — APPOINTMENT (OUTPATIENT)
Dept: CARDIOLOGY | Facility: CLINIC | Age: 75
End: 2025-02-11
Payer: MEDICARE

## 2025-02-11 ENCOUNTER — NON-APPOINTMENT (OUTPATIENT)
Age: 75
End: 2025-02-11

## 2025-02-11 PROCEDURE — 93298 REM INTERROG DEV EVAL SCRMS: CPT

## 2025-03-03 ENCOUNTER — APPOINTMENT (OUTPATIENT)
Dept: ELECTROPHYSIOLOGY | Facility: CLINIC | Age: 75
End: 2025-03-03
Payer: MEDICARE

## 2025-03-03 VITALS
WEIGHT: 315 LBS | HEART RATE: 67 BPM | HEIGHT: 71 IN | SYSTOLIC BLOOD PRESSURE: 146 MMHG | DIASTOLIC BLOOD PRESSURE: 67 MMHG | BODY MASS INDEX: 44.1 KG/M2 | TEMPERATURE: 98 F

## 2025-03-03 VITALS
HEART RATE: 6 BPM | DIASTOLIC BLOOD PRESSURE: 67 MMHG | TEMPERATURE: 98 F | BODY MASS INDEX: 22.4 KG/M2 | SYSTOLIC BLOOD PRESSURE: 146 MMHG | WEIGHT: 160 LBS | HEIGHT: 71 IN

## 2025-03-03 DIAGNOSIS — I48.0 PAROXYSMAL ATRIAL FIBRILLATION: ICD-10-CM

## 2025-03-03 DIAGNOSIS — Z45.09 ENCOUNTER FOR ADJUSTMENT AND MANAGEMENT OF OTHER CARDIAC DEVICE: ICD-10-CM

## 2025-03-03 DIAGNOSIS — I49.3 VENTRICULAR PREMATURE DEPOLARIZATION: ICD-10-CM

## 2025-03-03 DIAGNOSIS — I48.91 UNSPECIFIED ATRIAL FIBRILLATION: ICD-10-CM

## 2025-03-03 DIAGNOSIS — I63.9 CEREBRAL INFARCTION, UNSPECIFIED: ICD-10-CM

## 2025-03-03 DIAGNOSIS — R00.1 BRADYCARDIA, UNSPECIFIED: ICD-10-CM

## 2025-03-03 PROCEDURE — 93291 INTERROG DEV EVAL SCRMS IP: CPT

## 2025-03-03 PROCEDURE — 99214 OFFICE O/P EST MOD 30 MIN: CPT

## 2025-04-03 ENCOUNTER — APPOINTMENT (OUTPATIENT)
Dept: CARDIOLOGY | Facility: CLINIC | Age: 75
End: 2025-04-03
Payer: MEDICARE

## 2025-04-03 ENCOUNTER — NON-APPOINTMENT (OUTPATIENT)
Age: 75
End: 2025-04-03

## 2025-04-03 PROCEDURE — 93298 REM INTERROG DEV EVAL SCRMS: CPT

## 2025-05-01 ENCOUNTER — APPOINTMENT (OUTPATIENT)
Dept: PULMONOLOGY | Facility: CLINIC | Age: 75
End: 2025-05-01
Payer: MEDICARE

## 2025-05-01 VITALS — SYSTOLIC BLOOD PRESSURE: 110 MMHG | HEART RATE: 75 BPM | OXYGEN SATURATION: 97 % | DIASTOLIC BLOOD PRESSURE: 62 MMHG

## 2025-05-01 DIAGNOSIS — R91.8 OTHER NONSPECIFIC ABNORMAL FINDING OF LUNG FIELD: ICD-10-CM

## 2025-05-01 DIAGNOSIS — G47.33 OBSTRUCTIVE SLEEP APNEA (ADULT) (PEDIATRIC): ICD-10-CM

## 2025-05-01 DIAGNOSIS — J43.2 CENTRILOBULAR EMPHYSEMA: ICD-10-CM

## 2025-05-01 PROCEDURE — G2211 COMPLEX E/M VISIT ADD ON: CPT

## 2025-05-01 PROCEDURE — 99214 OFFICE O/P EST MOD 30 MIN: CPT

## 2025-05-01 RX ORDER — TIOTROPIUM BROMIDE AND OLODATEROL 3.124; 2.736 UG/1; UG/1
2.5-2.5 SPRAY, METERED RESPIRATORY (INHALATION) DAILY
Qty: 1 | Refills: 5 | Status: ACTIVE | COMMUNITY
Start: 2025-05-01 | End: 1900-01-01

## 2025-05-01 RX ORDER — ALBUTEROL SULFATE 90 UG/1
108 (90 BASE) AEROSOL, METERED RESPIRATORY (INHALATION)
Qty: 1 | Refills: 5 | Status: ACTIVE | COMMUNITY
Start: 2025-05-01 | End: 1900-01-01

## 2025-05-01 RX ORDER — ACETAMINOPHEN 650 MG/1
650 TABLET, EXTENDED RELEASE ORAL
Refills: 0 | Status: ACTIVE | COMMUNITY

## 2025-05-08 ENCOUNTER — NON-APPOINTMENT (OUTPATIENT)
Age: 75
End: 2025-05-08

## 2025-05-08 ENCOUNTER — APPOINTMENT (OUTPATIENT)
Dept: CARDIOLOGY | Facility: CLINIC | Age: 75
End: 2025-05-08
Payer: MEDICARE

## 2025-05-08 PROCEDURE — 93298 REM INTERROG DEV EVAL SCRMS: CPT

## 2025-06-11 ENCOUNTER — NON-APPOINTMENT (OUTPATIENT)
Age: 75
End: 2025-06-11

## 2025-06-11 ENCOUNTER — APPOINTMENT (OUTPATIENT)
Dept: CARDIOLOGY | Facility: CLINIC | Age: 75
End: 2025-06-11
Payer: MEDICARE

## 2025-06-11 PROCEDURE — 93298 REM INTERROG DEV EVAL SCRMS: CPT

## 2025-07-16 ENCOUNTER — NON-APPOINTMENT (OUTPATIENT)
Age: 75
End: 2025-07-16

## 2025-07-16 ENCOUNTER — APPOINTMENT (OUTPATIENT)
Dept: CARDIOLOGY | Facility: CLINIC | Age: 75
End: 2025-07-16
Payer: MEDICARE

## 2025-07-16 PROCEDURE — 93298 REM INTERROG DEV EVAL SCRMS: CPT

## 2025-08-07 ENCOUNTER — APPOINTMENT (OUTPATIENT)
Dept: PULMONOLOGY | Facility: CLINIC | Age: 75
End: 2025-08-07
Payer: MEDICARE

## 2025-08-07 VITALS
DIASTOLIC BLOOD PRESSURE: 72 MMHG | OXYGEN SATURATION: 96 % | HEART RATE: 66 BPM | SYSTOLIC BLOOD PRESSURE: 148 MMHG | WEIGHT: 167 LBS | BODY MASS INDEX: 23.29 KG/M2

## 2025-08-07 DIAGNOSIS — G47.33 OBSTRUCTIVE SLEEP APNEA (ADULT) (PEDIATRIC): ICD-10-CM

## 2025-08-07 DIAGNOSIS — J43.2 CENTRILOBULAR EMPHYSEMA: ICD-10-CM

## 2025-08-07 DIAGNOSIS — R91.8 OTHER NONSPECIFIC ABNORMAL FINDING OF LUNG FIELD: ICD-10-CM

## 2025-08-07 PROCEDURE — 99214 OFFICE O/P EST MOD 30 MIN: CPT

## 2025-08-07 PROCEDURE — G2211 COMPLEX E/M VISIT ADD ON: CPT

## 2025-08-07 RX ORDER — UMECLIDINIUM BROMIDE AND VILANTEROL TRIFENATATE 62.5; 25 UG/1; UG/1
62.5-25 POWDER RESPIRATORY (INHALATION)
Qty: 2 | Refills: 3 | Status: ACTIVE | COMMUNITY
Start: 2025-08-07 | End: 1900-01-01

## 2025-08-20 ENCOUNTER — APPOINTMENT (OUTPATIENT)
Dept: CARDIOLOGY | Facility: CLINIC | Age: 75
End: 2025-08-20

## 2025-08-20 ENCOUNTER — NON-APPOINTMENT (OUTPATIENT)
Age: 75
End: 2025-08-20

## 2025-08-20 PROCEDURE — 93298 REM INTERROG DEV EVAL SCRMS: CPT

## 2025-08-27 ENCOUNTER — OUTPATIENT (OUTPATIENT)
Dept: OUTPATIENT SERVICES | Facility: HOSPITAL | Age: 75
LOS: 1 days | End: 2025-08-27

## 2025-08-27 ENCOUNTER — APPOINTMENT (OUTPATIENT)
Dept: PULMONOLOGY | Facility: HOSPITAL | Age: 75
End: 2025-08-27

## 2025-08-27 DIAGNOSIS — R06.02 SHORTNESS OF BREATH: ICD-10-CM

## 2025-08-27 PROCEDURE — 94618 PULMONARY STRESS TESTING: CPT

## 2025-08-27 PROCEDURE — 94664 DEMO&/EVAL PT USE INHALER: CPT

## 2025-08-27 PROCEDURE — 94727 GAS DIL/WSHOT DETER LNG VOL: CPT

## 2025-08-27 PROCEDURE — 94729 DIFFUSING CAPACITY: CPT

## 2025-08-27 PROCEDURE — 94070 EVALUATION OF WHEEZING: CPT

## 2025-08-28 DIAGNOSIS — R06.02 SHORTNESS OF BREATH: ICD-10-CM
